# Patient Record
Sex: FEMALE | Race: BLACK OR AFRICAN AMERICAN | Employment: PART TIME | URBAN - METROPOLITAN AREA
[De-identification: names, ages, dates, MRNs, and addresses within clinical notes are randomized per-mention and may not be internally consistent; named-entity substitution may affect disease eponyms.]

---

## 2022-11-17 ENCOUNTER — OFFICE VISIT (OUTPATIENT)
Dept: FAMILY MEDICINE CLINIC | Age: 37
End: 2022-11-17
Payer: COMMERCIAL

## 2022-11-17 VITALS
TEMPERATURE: 97.9 F | HEART RATE: 77 BPM | SYSTOLIC BLOOD PRESSURE: 130 MMHG | RESPIRATION RATE: 16 BRPM | WEIGHT: 181.2 LBS | OXYGEN SATURATION: 98 % | HEIGHT: 64 IN | BODY MASS INDEX: 30.93 KG/M2 | DIASTOLIC BLOOD PRESSURE: 80 MMHG

## 2022-11-17 DIAGNOSIS — R10.32 LEFT LOWER QUADRANT ABDOMINAL PAIN: ICD-10-CM

## 2022-11-17 DIAGNOSIS — D25.0 SUBMUCOUS LEIOMYOMA OF UTERUS: ICD-10-CM

## 2022-11-17 DIAGNOSIS — D64.9 ANEMIA, UNSPECIFIED TYPE: ICD-10-CM

## 2022-11-17 DIAGNOSIS — R10.32 LEFT LOWER QUADRANT ABDOMINAL PAIN: Primary | ICD-10-CM

## 2022-11-17 LAB
A/G RATIO: 1.4 (ref 1.1–2.2)
ALBUMIN SERPL-MCNC: 4.1 G/DL (ref 3.4–5)
ALP BLD-CCNC: 46 U/L (ref 40–129)
ALT SERPL-CCNC: 8 U/L (ref 10–40)
ANION GAP SERPL CALCULATED.3IONS-SCNC: 12 MMOL/L (ref 3–16)
AST SERPL-CCNC: 13 U/L (ref 15–37)
BASOPHILS ABSOLUTE: 0 K/UL (ref 0–0.2)
BASOPHILS RELATIVE PERCENT: 0.3 %
BILIRUB SERPL-MCNC: 0.3 MG/DL (ref 0–1)
BILIRUBIN, POC: NORMAL
BLOOD URINE, POC: NORMAL
BUN BLDV-MCNC: 17 MG/DL (ref 7–20)
CALCIUM SERPL-MCNC: 8.9 MG/DL (ref 8.3–10.6)
CHLORIDE BLD-SCNC: 102 MMOL/L (ref 99–110)
CHOLESTEROL, TOTAL: 170 MG/DL (ref 0–199)
CLARITY, POC: CLEAR
CO2: 22 MMOL/L (ref 21–32)
COLOR, POC: YELLOW
CREAT SERPL-MCNC: 0.7 MG/DL (ref 0.6–1.1)
EOSINOPHILS ABSOLUTE: 0.1 K/UL (ref 0–0.6)
EOSINOPHILS RELATIVE PERCENT: 3.5 %
GFR SERPL CREATININE-BSD FRML MDRD: >60 ML/MIN/{1.73_M2}
GLUCOSE BLD-MCNC: 103 MG/DL (ref 70–99)
GLUCOSE URINE, POC: NORMAL
HCT VFR BLD CALC: 36.5 % (ref 36–48)
HDLC SERPL-MCNC: 40 MG/DL (ref 40–60)
HEMOGLOBIN: 11.6 G/DL (ref 12–16)
KETONES, POC: NORMAL
LDL CHOLESTEROL CALCULATED: 114 MG/DL
LEUKOCYTE EST, POC: NORMAL
LYMPHOCYTES ABSOLUTE: 1.3 K/UL (ref 1–5.1)
LYMPHOCYTES RELATIVE PERCENT: 38.7 %
MCH RBC QN AUTO: 27.1 PG (ref 26–34)
MCHC RBC AUTO-ENTMCNC: 31.8 G/DL (ref 31–36)
MCV RBC AUTO: 85.3 FL (ref 80–100)
MONOCYTES ABSOLUTE: 0.2 K/UL (ref 0–1.3)
MONOCYTES RELATIVE PERCENT: 7.5 %
NEUTROPHILS ABSOLUTE: 1.6 K/UL (ref 1.7–7.7)
NEUTROPHILS RELATIVE PERCENT: 50 %
NITRITE, POC: NORMAL
PDW BLD-RTO: 14.6 % (ref 12.4–15.4)
PH, POC: 6
PLATELET # BLD: 180 K/UL (ref 135–450)
PMV BLD AUTO: 9.7 FL (ref 5–10.5)
POTASSIUM SERPL-SCNC: 4.1 MMOL/L (ref 3.5–5.1)
PROTEIN, POC: NORMAL
RBC # BLD: 4.27 M/UL (ref 4–5.2)
SODIUM BLD-SCNC: 136 MMOL/L (ref 136–145)
SPECIFIC GRAVITY, POC: 1.03
TOTAL PROTEIN: 7 G/DL (ref 6.4–8.2)
TRIGL SERPL-MCNC: 81 MG/DL (ref 0–150)
TSH REFLEX: 1.54 UIU/ML (ref 0.27–4.2)
UROBILINOGEN, POC: 1
VLDLC SERPL CALC-MCNC: 16 MG/DL
WBC # BLD: 3.2 K/UL (ref 4–11)

## 2022-11-17 PROCEDURE — 36415 COLL VENOUS BLD VENIPUNCTURE: CPT | Performed by: NURSE PRACTITIONER

## 2022-11-17 PROCEDURE — 99203 OFFICE O/P NEW LOW 30 MIN: CPT | Performed by: NURSE PRACTITIONER

## 2022-11-17 PROCEDURE — 81002 URINALYSIS NONAUTO W/O SCOPE: CPT | Performed by: NURSE PRACTITIONER

## 2022-11-17 RX ORDER — NAPROXEN SODIUM 220 MG
220 TABLET ORAL 2 TIMES DAILY WITH MEALS
COMMUNITY

## 2022-11-17 SDOH — ECONOMIC STABILITY: FOOD INSECURITY: WITHIN THE PAST 12 MONTHS, YOU WORRIED THAT YOUR FOOD WOULD RUN OUT BEFORE YOU GOT MONEY TO BUY MORE.: NEVER TRUE

## 2022-11-17 SDOH — ECONOMIC STABILITY: FOOD INSECURITY: WITHIN THE PAST 12 MONTHS, THE FOOD YOU BOUGHT JUST DIDN'T LAST AND YOU DIDN'T HAVE MONEY TO GET MORE.: NEVER TRUE

## 2022-11-17 ASSESSMENT — ENCOUNTER SYMPTOMS
DIARRHEA: 0
CHEST TIGHTNESS: 0
CONSTIPATION: 1
ABDOMINAL DISTENTION: 0
WHEEZING: 0
RECTAL PAIN: 1
NAUSEA: 0
ANAL BLEEDING: 1
BACK PAIN: 0
VOMITING: 0
SHORTNESS OF BREATH: 0
BLOOD IN STOOL: 1
ABDOMINAL PAIN: 1

## 2022-11-17 ASSESSMENT — SOCIAL DETERMINANTS OF HEALTH (SDOH): HOW HARD IS IT FOR YOU TO PAY FOR THE VERY BASICS LIKE FOOD, HOUSING, MEDICAL CARE, AND HEATING?: NOT HARD AT ALL

## 2022-11-17 ASSESSMENT — PATIENT HEALTH QUESTIONNAIRE - PHQ9
SUM OF ALL RESPONSES TO PHQ QUESTIONS 1-9: 0
2. FEELING DOWN, DEPRESSED OR HOPELESS: 0
SUM OF ALL RESPONSES TO PHQ QUESTIONS 1-9: 0
SUM OF ALL RESPONSES TO PHQ QUESTIONS 1-9: 0
SUM OF ALL RESPONSES TO PHQ9 QUESTIONS 1 & 2: 0
SUM OF ALL RESPONSES TO PHQ QUESTIONS 1-9: 0
1. LITTLE INTEREST OR PLEASURE IN DOING THINGS: 0

## 2022-11-17 NOTE — PROGRESS NOTES
Poly Montilla (:  1985) is a 40 y.o. female,New patient, here for evaluation of the following chief complaint(s):  New Patient    Poly Rivera     ASSESSMENT/PLAN:  1. Left lower quadrant abdominal pain  -     Comprehensive Metabolic Panel; Future  -     Hemoglobin A1C; Future  -     Lipid Panel; Future  -     CBC with Auto Differential; Future  -     POCT Urinalysis no Micro  -     TSH with Reflex  -     US PELVIS COMPLETE; Future  -     US NON OB TRANSVAGINAL; Future    Patient Instructions   Blood work today  Schedule ultrasound  Follow up in 1-2 weeks after ultrasound is complete     Return in about 2 weeks (around 2022). SUBJECTIVE/OBJECTIVE:  Pt here to establish care. Was seeing UC physicians but had to switch d/t insurance. Unable to see previous records d/t chart merge in process. Pt c/o left lower quadrant abd pain that has increased over the last 2 week. Radiates to left side/back. Admits to frequent urination, pelvic pain with urination and bowel movements, and intermittent blood in stools. States blood is minimal and does not occur with every bm. Hx of ectopic pregnancy in right tube, and had left tube removed d/t cyst like growth 1 year ago. Menses are regular, denies increased pain with menses. Current Outpatient Medications   Medication Sig Dispense Refill    naproxen sodium (ALEVE) 220 MG tablet Take 220 mg by mouth 2 times daily (with meals)       No current facility-administered medications for this visit. Past Medical History:  No date: Ectopic pregnancy  History reviewed. No pertinent surgical history.   Social History     Socioeconomic History    Marital status: Not on file     Spouse name: Not on file    Number of children: Not on file    Years of education: Not on file    Highest education level: Not on file   Occupational History    Not on file   Tobacco Use    Smoking status: Never    Smokeless tobacco: Never   Vaping Use    Vaping Use: Never used   Substance and Sexual Activity    Alcohol use: Never    Drug use: Never    Sexual activity: Not Currently   Other Topics Concern    Not on file   Social History Narrative    Not on file     Social Determinants of Health     Financial Resource Strain: Low Risk     Difficulty of Paying Living Expenses: Not hard at all   Food Insecurity: No Food Insecurity    Worried About Running Out of Food in the Last Year: Never true    Ran Out of Food in the Last Year: Never true   Transportation Needs: Not on file   Physical Activity: Not on file   Stress: Not on file   Social Connections: Not on file   Intimate Partner Violence: Not on file   Housing Stability: Not on file         Review of Systems   Constitutional:  Negative for activity change, appetite change, chills, fatigue and fever. Eyes:  Negative for visual disturbance. Respiratory:  Negative for chest tightness, shortness of breath and wheezing. Cardiovascular:  Negative for chest pain, palpitations and leg swelling. Gastrointestinal:  Positive for abdominal pain, anal bleeding, blood in stool, constipation (at times, relieved with prune juice) and rectal pain. Negative for abdominal distention, diarrhea, nausea and vomiting. Genitourinary:  Positive for dyspareunia, dysuria, frequency, pelvic pain and urgency. Negative for decreased urine volume, difficulty urinating, enuresis, flank pain, genital sores, hematuria, menstrual problem, vaginal discharge and vaginal pain. Musculoskeletal:  Negative for arthralgias, back pain and gait problem.    Vitals:    11/17/22 0809   BP: 130/80   Pulse: 77   Resp: 16   Temp: 97.9 °F (36.6 °C)   TempSrc: Temporal   SpO2: 98%   Weight: 181 lb 3.2 oz (82.2 kg)   Height: 5' 4\" (1.626 m)      Recent Results (from the past 2016 hour(s))   POCT Urinalysis no Micro    Collection Time: 11/17/22  8:59 AM   Result Value Ref Range    Color, UA yellow     Clarity, UA clear     Glucose, UA POC neg     Bilirubin, UA neg Ketones, UA neg     Spec Grav, UA 1.030     Blood, UA POC neg     pH, UA 6.0     Protein, UA POC neg     Urobilinogen, UA 1.0     Leukocytes, UA neg     Nitrite, UA neg         Wt Readings from Last 3 Encounters:   11/17/22 181 lb 3.2 oz (82.2 kg)        Physical Exam  Vitals and nursing note reviewed. Constitutional:       General: She is not in acute distress. Appearance: Normal appearance. She is not ill-appearing. HENT:      Head: Normocephalic and atraumatic. Nose: Nose normal. No congestion. Eyes:      Conjunctiva/sclera: Conjunctivae normal.      Pupils: Pupils are equal, round, and reactive to light. Cardiovascular:      Rate and Rhythm: Normal rate and regular rhythm. Pulmonary:      Effort: Pulmonary effort is normal.      Breath sounds: Normal breath sounds. Abdominal:      General: Abdomen is flat. Bowel sounds are normal. There is no distension or abdominal bruit. There are no signs of injury. Palpations: Abdomen is soft. There is no shifting dullness, hepatomegaly, splenomegaly or mass. Tenderness: There is abdominal tenderness. There is no right CVA tenderness, left CVA tenderness, guarding or rebound. Negative signs include Richardson's sign and McBurney's sign. Hernia: No hernia is present. Musculoskeletal:         General: Normal range of motion. Cervical back: Normal range of motion and neck supple. Skin:     General: Skin is warm. Neurological:      General: No focal deficit present. Mental Status: She is alert and oriented to person, place, and time. Psychiatric:         Mood and Affect: Mood normal.         Behavior: Behavior normal.                 An electronic signature was used to authenticate this note.     --Braeden Marina, APRN - CNP

## 2022-11-18 ENCOUNTER — PATIENT MESSAGE (OUTPATIENT)
Dept: FAMILY MEDICINE CLINIC | Age: 37
End: 2022-11-18

## 2022-11-18 DIAGNOSIS — R10.32 LEFT LOWER QUADRANT ABDOMINAL PAIN: Primary | ICD-10-CM

## 2022-11-18 LAB
ESTIMATED AVERAGE GLUCOSE: 91.1 MG/DL
HBA1C MFR BLD: 4.8 %
IRON SATURATION: 21 % (ref 15–50)
IRON: 55 UG/DL (ref 37–145)
TOTAL IRON BINDING CAPACITY: 266 UG/DL (ref 260–445)

## 2022-11-29 ENCOUNTER — HOSPITAL ENCOUNTER (OUTPATIENT)
Dept: ULTRASOUND IMAGING | Age: 37
Discharge: HOME | End: 2022-11-29
Payer: COMMERCIAL

## 2022-11-29 DIAGNOSIS — R10.32 LEFT LOWER QUADRANT ABDOMINAL PAIN: ICD-10-CM

## 2022-11-29 PROCEDURE — 76856 US EXAM PELVIC COMPLETE: CPT

## 2022-11-30 ENCOUNTER — TELEPHONE (OUTPATIENT)
Dept: FAMILY MEDICINE CLINIC | Age: 37
End: 2022-11-30

## 2022-11-30 NOTE — TELEPHONE ENCOUNTER
Patient need help with accessing my chart / [facility]    Patient states she has been blocked out  Asking for help    Patient can't access

## 2022-12-13 ENCOUNTER — OFFICE VISIT (OUTPATIENT)
Dept: FAMILY MEDICINE CLINIC | Age: 37
End: 2022-12-13
Payer: COMMERCIAL

## 2022-12-13 VITALS
TEMPERATURE: 98.2 F | WEIGHT: 180.8 LBS | DIASTOLIC BLOOD PRESSURE: 76 MMHG | BODY MASS INDEX: 30.87 KG/M2 | SYSTOLIC BLOOD PRESSURE: 130 MMHG | RESPIRATION RATE: 14 BRPM | HEART RATE: 76 BPM | HEIGHT: 64 IN | OXYGEN SATURATION: 98 %

## 2022-12-13 DIAGNOSIS — D25.0 SUBMUCOUS LEIOMYOMA OF UTERUS: ICD-10-CM

## 2022-12-13 DIAGNOSIS — N83.202 CYST OF LEFT OVARY: Primary | ICD-10-CM

## 2022-12-13 PROCEDURE — 99213 OFFICE O/P EST LOW 20 MIN: CPT | Performed by: NURSE PRACTITIONER

## 2022-12-13 ASSESSMENT — PATIENT HEALTH QUESTIONNAIRE - PHQ9
SUM OF ALL RESPONSES TO PHQ QUESTIONS 1-9: 0
1. LITTLE INTEREST OR PLEASURE IN DOING THINGS: 0
SUM OF ALL RESPONSES TO PHQ QUESTIONS 1-9: 0
2. FEELING DOWN, DEPRESSED OR HOPELESS: 0
SUM OF ALL RESPONSES TO PHQ9 QUESTIONS 1 & 2: 0
SUM OF ALL RESPONSES TO PHQ QUESTIONS 1-9: 0
SUM OF ALL RESPONSES TO PHQ QUESTIONS 1-9: 0

## 2022-12-13 ASSESSMENT — ANXIETY QUESTIONNAIRES
2. NOT BEING ABLE TO STOP OR CONTROL WORRYING: 0
5. BEING SO RESTLESS THAT IT IS HARD TO SIT STILL: 0
6. BECOMING EASILY ANNOYED OR IRRITABLE: 0
GAD7 TOTAL SCORE: 0
4. TROUBLE RELAXING: 0
IF YOU CHECKED OFF ANY PROBLEMS ON THIS QUESTIONNAIRE, HOW DIFFICULT HAVE THESE PROBLEMS MADE IT FOR YOU TO DO YOUR WORK, TAKE CARE OF THINGS AT HOME, OR GET ALONG WITH OTHER PEOPLE: NOT DIFFICULT AT ALL
1. FEELING NERVOUS, ANXIOUS, OR ON EDGE: 0
3. WORRYING TOO MUCH ABOUT DIFFERENT THINGS: 0
7. FEELING AFRAID AS IF SOMETHING AWFUL MIGHT HAPPEN: 0

## 2022-12-18 ASSESSMENT — ENCOUNTER SYMPTOMS
BACK PAIN: 0
BLOOD IN STOOL: 0
APNEA: 0
COUGH: 0
COLOR CHANGE: 0
WHEEZING: 0
SHORTNESS OF BREATH: 0
EYE REDNESS: 0
CONSTIPATION: 0
DIARRHEA: 0
TROUBLE SWALLOWING: 0
CHEST TIGHTNESS: 0
ABDOMINAL PAIN: 0
NAUSEA: 0
VOMITING: 0

## 2022-12-18 NOTE — PROGRESS NOTES
Galen Montilla (:  1985) is a 40 y.o. female,Established patient, here for evaluation of the following chief complaint(s): Other (Review labs and U/S)      ASSESSMENT/PLAN:  1. Cyst of left ovary  - resources given for previous referral to Dr. Susanna Merrill  - Dr. Luana Glover information given for reproductive endocrinology, will have to verify with insurance  2. Submucous leiomyoma of uterus  - needs obgyn follow up    Patient Instructions   Follow up with DR. Susanna Merrill or Dr. Luana Glover  Follow up as needed     No follow-ups on file. SUBJECTIVE/OBJECTIVE:  Pt here for follow up on lab results and discuss fertility options. Had pelvic ultrasound for pelvic pain which shows benign ovarian cyst and uterine fibroid. Pt previously saw  physicians for obgyn and reproductive endocrinology but had insurance change and is not able to follow up with them. Pt is interested in conceiving       Current Outpatient Medications   Medication Sig Dispense Refill    naproxen sodium (ALEVE) 220 MG tablet Take 220 mg by mouth 2 times daily (with meals)       No current facility-administered medications for this visit. Past Medical History:  No date: Allergic rhinitis  No date: Ectopic pregnancy  Past Surgical History:   Procedure Laterality Date    ECTOPIC PREGNANCY SURGERY  2012    tubal; treated [facility] in [facility].      LAPAROTOMY      remove swallowed object    SALPINGECTOMY Right 2012    ectopic     Social History     Socioeconomic History    Marital status:      Spouse name: Haylie    Number of children: 0    Years of education: Not on file    Highest education level: Not on file   Occupational History    Occupation: packaging: [employer]   Tobacco Use    Smoking status: Never    Smokeless tobacco: Never   Vaping Use    Vaping Use: Never used   Substance and Sexual Activity    Alcohol use: Never     Comment: occasionally    Drug use: Never    Sexual activity: Not Currently     Partners: Male     Comment:     Other Topics Concern    Not on file   Social History Narrative    ** Merged History Encounter **          Social Determinants of Health     Financial Resource Strain: Low Risk     Difficulty of Paying Living Expenses: Not hard at all   Food Insecurity: No Food Insecurity    Worried About Running Out of Food in the Last Year: Never true    Ran Out of Food in the Last Year: Never true   Transportation Needs: Not on file   Physical Activity: Not on file   Stress: Not on file   Social Connections: Not on file   Intimate Partner Violence: Not on file   Housing Stability: Not on file         Review of Systems   Constitutional:  Negative for appetite change, chills, diaphoresis, fatigue, fever and unexpected weight change. HENT:  Negative for congestion, dental problem, ear pain, hearing loss and trouble swallowing. Eyes:  Negative for redness and visual disturbance. Respiratory:  Negative for apnea, cough, chest tightness, shortness of breath and wheezing. Cardiovascular:  Negative for chest pain, palpitations and leg swelling. Gastrointestinal:  Negative for abdominal pain, blood in stool, constipation, diarrhea, nausea and vomiting. Endocrine: Negative for cold intolerance, heat intolerance, polydipsia, polyphagia and polyuria. Genitourinary:  Positive for menstrual problem. Negative for decreased urine volume, difficulty urinating and hematuria. Musculoskeletal:  Negative for arthralgias, back pain, gait problem, joint swelling and myalgias. Skin:  Negative for color change, pallor and rash. Allergic/Immunologic: Negative for environmental allergies, food allergies and immunocompromised state. Neurological:  Negative for dizziness, weakness and headaches. Psychiatric/Behavioral:  Negative for agitation, confusion and sleep disturbance.     Vitals:    12/13/22 1300   BP: 130/76   Pulse: 76   Resp: 14   Temp: 98.2 °F (36.8 °C)   SpO2: 98%   Weight: 180 lb 12.8 oz (82 kg)   Height: 5' 4\" (1.626 m)      Recent Results (from the past 2016 hour(s))   POCT Urinalysis no Micro    Collection Time: 11/17/22  8:59 AM   Result Value Ref Range    Color, UA yellow     Clarity, UA clear     Glucose, UA POC neg     Bilirubin, UA neg     Ketones, UA neg     Spec Grav, UA 1.030     Blood, UA POC neg     pH, UA 6.0     Protein, UA POC neg     Urobilinogen, UA 1.0     Leukocytes, UA neg     Nitrite, UA neg    TSH with Reflex    Collection Time: 11/17/22  9:05 AM   Result Value Ref Range    TSH 1.54 0.27 - 4.20 uIU/mL   CBC with Auto Differential    Collection Time: 11/17/22  9:05 AM   Result Value Ref Range    WBC 3.2 (L) 4.0 - 11.0 K/uL    RBC 4.27 4.00 - 5.20 M/uL    Hemoglobin 11.6 (L) 12.0 - 16.0 g/dL    Hematocrit 36.5 36.0 - 48.0 %    MCV 85.3 80.0 - 100.0 fL    MCH 27.1 26.0 - 34.0 pg    MCHC 31.8 31.0 - 36.0 g/dL    RDW 14.6 12.4 - 15.4 %    Platelets 155 160 - 558 K/uL    MPV 9.7 5.0 - 10.5 fL    Neutrophils % 50.0 %    Lymphocytes % 38.7 %    Monocytes % 7.5 %    Eosinophils % 3.5 %    Basophils % 0.3 %    Neutrophils Absolute 1.6 (L) 1.7 - 7.7 K/uL    Lymphocytes Absolute 1.3 1.0 - 5.1 K/uL    Monocytes Absolute 0.2 0.0 - 1.3 K/uL    Eosinophils Absolute 0.1 0.0 - 0.6 K/uL    Basophils Absolute 0.0 0.0 - 0.2 K/uL   Lipid Panel    Collection Time: 11/17/22  9:05 AM   Result Value Ref Range    Cholesterol, Total 170 0 - 199 mg/dL    Triglycerides 81 0 - 150 mg/dL    HDL 40 40 - 60 mg/dL    LDL Calculated 114 (H) <100 mg/dL    VLDL Cholesterol Calculated 16 Not Established mg/dL   Hemoglobin A1C    Collection Time: 11/17/22  9:05 AM   Result Value Ref Range    Hemoglobin A1C 4.8 See comment %    eAG 91.1 mg/dL   Comprehensive Metabolic Panel    Collection Time: 11/17/22  9:05 AM   Result Value Ref Range    Sodium 136 136 - 145 mmol/L    Potassium 4.1 3.5 - 5.1 mmol/L    Chloride 102 99 - 110 mmol/L    CO2 22 21 - 32 mmol/L    Anion Gap 12 3 - 16    Glucose 103 (H) 70 - 99 mg/dL    BUN 17 Behavior: Behavior normal.         Thought Content: Thought content normal.         Judgment: Judgment normal.                 An electronic signature was used to authenticate this note.     --MESFIN Post - CNP

## 2023-10-04 ENCOUNTER — OFFICE VISIT (OUTPATIENT)
Dept: FAMILY MEDICINE CLINIC | Age: 38
End: 2023-10-04
Payer: COMMERCIAL

## 2023-10-04 VITALS
HEIGHT: 64 IN | RESPIRATION RATE: 16 BRPM | SYSTOLIC BLOOD PRESSURE: 118 MMHG | BODY MASS INDEX: 30.05 KG/M2 | OXYGEN SATURATION: 98 % | DIASTOLIC BLOOD PRESSURE: 64 MMHG | TEMPERATURE: 98.4 F | WEIGHT: 176 LBS | HEART RATE: 64 BPM

## 2023-10-04 DIAGNOSIS — H69.93 DYSFUNCTION OF BOTH EUSTACHIAN TUBES: ICD-10-CM

## 2023-10-04 DIAGNOSIS — B96.89 ACUTE BACTERIAL SINUSITIS: ICD-10-CM

## 2023-10-04 DIAGNOSIS — R07.89 OTHER CHEST PAIN: Primary | ICD-10-CM

## 2023-10-04 DIAGNOSIS — J01.90 ACUTE BACTERIAL SINUSITIS: ICD-10-CM

## 2023-10-04 DIAGNOSIS — K21.9 GASTROESOPHAGEAL REFLUX DISEASE WITHOUT ESOPHAGITIS: ICD-10-CM

## 2023-10-04 DIAGNOSIS — R42 DIZZINESS: ICD-10-CM

## 2023-10-04 PROCEDURE — 93000 ELECTROCARDIOGRAM COMPLETE: CPT | Performed by: NURSE PRACTITIONER

## 2023-10-04 PROCEDURE — 99214 OFFICE O/P EST MOD 30 MIN: CPT | Performed by: NURSE PRACTITIONER

## 2023-10-04 RX ORDER — PANTOPRAZOLE SODIUM 40 MG/1
40 TABLET, DELAYED RELEASE ORAL
Qty: 14 TABLET | Refills: 0 | Status: SHIPPED | OUTPATIENT
Start: 2023-10-04 | End: 2023-10-18

## 2023-10-04 RX ORDER — AMOXICILLIN 875 MG/1
875 TABLET, COATED ORAL 2 TIMES DAILY
Qty: 20 TABLET | Refills: 0 | Status: SHIPPED | OUTPATIENT
Start: 2023-10-04 | End: 2023-10-14

## 2023-10-04 RX ORDER — FLUTICASONE PROPIONATE 50 MCG
2 SPRAY, SUSPENSION (ML) NASAL DAILY
Qty: 16 G | Refills: 0 | Status: SHIPPED | OUTPATIENT
Start: 2023-10-04

## 2023-10-04 SDOH — ECONOMIC STABILITY: HOUSING INSECURITY
IN THE LAST 12 MONTHS, WAS THERE A TIME WHEN YOU DID NOT HAVE A STEADY PLACE TO SLEEP OR SLEPT IN A SHELTER (INCLUDING NOW)?: NO

## 2023-10-04 SDOH — ECONOMIC STABILITY: INCOME INSECURITY: HOW HARD IS IT FOR YOU TO PAY FOR THE VERY BASICS LIKE FOOD, HOUSING, MEDICAL CARE, AND HEATING?: NOT HARD AT ALL

## 2023-10-04 SDOH — ECONOMIC STABILITY: FOOD INSECURITY: WITHIN THE PAST 12 MONTHS, THE FOOD YOU BOUGHT JUST DIDN'T LAST AND YOU DIDN'T HAVE MONEY TO GET MORE.: NEVER TRUE

## 2023-10-04 SDOH — ECONOMIC STABILITY: FOOD INSECURITY: WITHIN THE PAST 12 MONTHS, YOU WORRIED THAT YOUR FOOD WOULD RUN OUT BEFORE YOU GOT MONEY TO BUY MORE.: NEVER TRUE

## 2023-10-04 ASSESSMENT — ENCOUNTER SYMPTOMS
RHINORRHEA: 0
SHORTNESS OF BREATH: 0
WHEEZING: 0
HOARSE VOICE: 0
SINUS COMPLAINT: 1
TROUBLE SWALLOWING: 0
COUGH: 0
SINUS PRESSURE: 1
SORE THROAT: 0

## 2023-10-04 ASSESSMENT — PATIENT HEALTH QUESTIONNAIRE - PHQ9
SUM OF ALL RESPONSES TO PHQ QUESTIONS 1-9: 0
2. FEELING DOWN, DEPRESSED OR HOPELESS: 0
SUM OF ALL RESPONSES TO PHQ9 QUESTIONS 1 & 2: 0
1. LITTLE INTEREST OR PLEASURE IN DOING THINGS: 0
SUM OF ALL RESPONSES TO PHQ QUESTIONS 1-9: 0

## 2023-10-04 NOTE — PATIENT INSTRUCTIONS
Start amoxicillin x 10 days  Use flonase twice a day x 1 week then use once per day  Stop medication from urgent care  Start protonix daily before breakfast

## 2023-10-04 NOTE — PROGRESS NOTES
Problem  This is a new problem. The current episode started 1 to 4 weeks ago. The problem is unchanged. There has been no fever. She is experiencing no pain. Associated symptoms include headaches and sinus pressure. Pertinent negatives include no chills, congestion, coughing, diaphoresis, ear pain, hoarse voice, neck pain, shortness of breath or sore throat. The treatment provided no relief. Current Outpatient Medications   Medication Sig Dispense Refill    amoxicillin (AMOXIL) 875 MG tablet Take 1 tablet by mouth 2 times daily for 10 days 20 tablet 0    fluticasone (FLONASE) 50 MCG/ACT nasal spray 2 sprays by Each Nostril route daily 16 g 0    pantoprazole (PROTONIX) 40 MG tablet Take 1 tablet by mouth every morning (before breakfast) for 14 days 14 tablet 0     No current facility-administered medications for this visit. Past Medical History:  No date: Allergic rhinitis  No date: Ectopic pregnancy  Past Surgical History:   Procedure Laterality Date    ECTOPIC PREGNANCY SURGERY  2012    tubal; treated surgically in [place].      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

## 2023-10-18 ENCOUNTER — OFFICE VISIT (OUTPATIENT)
Dept: FAMILY MEDICINE CLINIC | Age: 38
End: 2023-10-18
Payer: COMMERCIAL

## 2023-10-18 VITALS
SYSTOLIC BLOOD PRESSURE: 122 MMHG | RESPIRATION RATE: 18 BRPM | HEART RATE: 82 BPM | WEIGHT: 176.1 LBS | OXYGEN SATURATION: 98 % | BODY MASS INDEX: 30.07 KG/M2 | HEIGHT: 64 IN | TEMPERATURE: 98 F | DIASTOLIC BLOOD PRESSURE: 80 MMHG

## 2023-10-18 DIAGNOSIS — R42 DIZZINESS: ICD-10-CM

## 2023-10-18 DIAGNOSIS — Z13.220 SCREENING FOR HYPERLIPIDEMIA: Primary | ICD-10-CM

## 2023-10-18 DIAGNOSIS — K21.9 GASTROESOPHAGEAL REFLUX DISEASE WITHOUT ESOPHAGITIS: ICD-10-CM

## 2023-10-18 PROCEDURE — 99213 OFFICE O/P EST LOW 20 MIN: CPT | Performed by: NURSE PRACTITIONER

## 2023-10-18 RX ORDER — PANTOPRAZOLE SODIUM 40 MG/1
40 TABLET, DELAYED RELEASE ORAL
Qty: 14 TABLET | Refills: 0 | Status: SHIPPED | OUTPATIENT
Start: 2023-10-18 | End: 2023-11-01

## 2023-10-18 ASSESSMENT — ENCOUNTER SYMPTOMS
SINUS COMPLAINT: 1
TROUBLE SWALLOWING: 0
WHEEZING: 0
SINUS PRESSURE: 0
RHINORRHEA: 0
SORE THROAT: 0
HOARSE VOICE: 0

## 2023-10-18 NOTE — PATIENT INSTRUCTIONS
[address] [facility] Laboratory Locations - No appointment necessary. ? indicates the location is open Saturdays in addition to Monday through Friday. Call your preferred location for test preparation, business hours and other information you need. [employer] accepts [employer]. [facility]    ? [place] [id number] [facility]. [facility], [address]    Ph: [address] [facility], [address]    Ph: [phone]   ? [address]    [place], [address] [facility]    Ph: [address] [facility] [address], [address]    Ph: [phone] ? [place]   [address]   Ph: [phone]  ? [address] [facility], [address] [facility]    Ph: [place] [address] [facility], [address] [facility]   Ph: [phone]    NORTH    ? [place], [place] [address]    Ph: [phone]  [facility], [address]   Ph: [facility]. [place], [address]    [address] [facility]: [facility] [address], [facility] [facility]    Ph: [facility].  [address] [place] [address]    Ph: [phone]

## 2023-10-31 DIAGNOSIS — K21.9 GASTROESOPHAGEAL REFLUX DISEASE WITHOUT ESOPHAGITIS: ICD-10-CM

## 2023-10-31 NOTE — TELEPHONE ENCOUNTER
Medication:   Requested Prescriptions     Pending Prescriptions Disp Refills    pantoprazole (PROTONIX) 40 MG tablet [Pharmacy Med Name: PANTOPRAZOLE 40MG TABLETS] 14 tablet 0     Sig: TAKE 1 TABLET BY MOUTH EVERY MORNING BEFORE BREAKFAST FOR 14 DAYS        Last Filled:      Patient Phone Number: [phone] (home)     Last appt: 10/18/2023   Next appt: Visit date not found    Last OARRS:        No data to display

## 2023-11-01 DIAGNOSIS — J01.90 ACUTE BACTERIAL SINUSITIS: ICD-10-CM

## 2023-11-01 DIAGNOSIS — B96.89 ACUTE BACTERIAL SINUSITIS: ICD-10-CM

## 2023-11-01 RX ORDER — PANTOPRAZOLE SODIUM 40 MG/1
TABLET, DELAYED RELEASE ORAL
Qty: 14 TABLET | Refills: 0 | Status: SHIPPED | OUTPATIENT
Start: 2023-11-01

## 2023-11-01 RX ORDER — FLUTICASONE PROPIONATE 50 MCG
2 SPRAY, SUSPENSION (ML) NASAL DAILY
Qty: 16 G | Refills: 0 | Status: SHIPPED | OUTPATIENT
Start: 2023-11-01

## 2023-11-01 NOTE — TELEPHONE ENCOUNTER
Medication:   Requested Prescriptions     Pending Prescriptions Disp Refills    fluticasone (FLONASE) 50 MCG/ACT nasal spray [Pharmacy Med Name: FLUTICASONE 50MCG NASAL SP (120) RX] 16 g 0     Sig: SHAKE LIQUID AND USE 2 SPRAYS IN EACH NOSTRIL DAILY        Last Filled:      Patient Phone Number: [phone] (home)     Last appt: 10/18/2023   Next appt: Visit date not found    Last OARRS:        No data to display

## 2024-01-15 ENCOUNTER — HOSPITAL ENCOUNTER (EMERGENCY)
Age: 39
Discharge: HOME | End: 2024-01-15
Attending: EMERGENCY MEDICINE
Payer: COMMERCIAL

## 2024-01-15 ENCOUNTER — APPOINTMENT (OUTPATIENT)
Dept: GENERAL RADIOLOGY | Age: 39
End: 2024-01-15
Payer: COMMERCIAL

## 2024-01-15 VITALS
DIASTOLIC BLOOD PRESSURE: 88 MMHG | RESPIRATION RATE: 16 BRPM | OXYGEN SATURATION: 97 % | TEMPERATURE: 98.8 F | HEART RATE: 68 BPM | SYSTOLIC BLOOD PRESSURE: 142 MMHG

## 2024-01-15 DIAGNOSIS — R07.89 CHEST PRESSURE: ICD-10-CM

## 2024-01-15 DIAGNOSIS — R00.2 PALPITATIONS: Primary | ICD-10-CM

## 2024-01-15 LAB
ALBUMIN SERPL-MCNC: 4.4 G/DL (ref 3.4–5)
ALBUMIN/GLOB SERPL: 1.3 {RATIO} (ref 1.1–2.2)
ALP SERPL-CCNC: 41 U/L (ref 40–129)
ALT SERPL-CCNC: 9 U/L (ref 10–40)
ANION GAP SERPL CALCULATED.3IONS-SCNC: 11 MMOL/L (ref 3–16)
AST SERPL-CCNC: 16 U/L (ref 15–37)
BASOPHILS # BLD: 0.1 K/UL (ref 0–0.2)
BASOPHILS NFR BLD: 0.9 %
BILIRUB SERPL-MCNC: 0.4 MG/DL (ref 0–1)
BUN SERPL-MCNC: 13 MG/DL (ref 7–20)
CALCIUM SERPL-MCNC: 9.3 MG/DL (ref 8.3–10.6)
CHLORIDE SERPL-SCNC: 102 MMOL/L (ref 99–110)
CO2 SERPL-SCNC: 22 MMOL/L (ref 21–32)
CREAT SERPL-MCNC: 0.7 MG/DL (ref 0.6–1.1)
D DIMER: 0.34 UG/ML FEU (ref 0–0.6)
DEPRECATED RDW RBC AUTO: 14.2 % (ref 12.4–15.4)
EOSINOPHIL # BLD: 0.1 K/UL (ref 0–0.6)
EOSINOPHIL NFR BLD: 2.4 %
GFR SERPLBLD CREATININE-BSD FMLA CKD-EPI: >60 ML/MIN/{1.73_M2}
GLUCOSE SERPL-MCNC: 94 MG/DL (ref 70–99)
HCG SERPL QL: NEGATIVE
HCT VFR BLD AUTO: 34.6 % (ref 36–48)
HGB BLD-MCNC: 11.3 G/DL (ref 12–16)
LYMPHOCYTES # BLD: 2.1 K/UL (ref 1–5.1)
LYMPHOCYTES NFR BLD: 33.9 %
MCH RBC QN AUTO: 27.2 PG (ref 26–34)
MCHC RBC AUTO-ENTMCNC: 32.6 G/DL (ref 31–36)
MCV RBC AUTO: 83.3 FL (ref 80–100)
MONOCYTES # BLD: 0.4 K/UL (ref 0–1.3)
MONOCYTES NFR BLD: 6 %
NEUTROPHILS # BLD: 3.5 K/UL (ref 1.7–7.7)
NEUTROPHILS NFR BLD: 56.8 %
NT-PROBNP SERPL-MCNC: <36 PG/ML (ref 0–124)
PLATELET # BLD AUTO: 194 K/UL (ref 135–450)
PMV BLD AUTO: 8.8 FL (ref 5–10.5)
POTASSIUM SERPL-SCNC: 3.7 MMOL/L (ref 3.5–5.1)
PROT SERPL-MCNC: 7.8 G/DL (ref 6.4–8.2)
RBC # BLD AUTO: 4.15 M/UL (ref 4–5.2)
SODIUM SERPL-SCNC: 135 MMOL/L (ref 136–145)
TROPONIN, HIGH SENSITIVITY: <6 NG/L (ref 0–14)
TROPONIN, HIGH SENSITIVITY: <6 NG/L (ref 0–14)
WBC # BLD AUTO: 6.1 K/UL (ref 4–11)

## 2024-01-15 PROCEDURE — 84703 CHORIONIC GONADOTROPIN ASSAY: CPT

## 2024-01-15 PROCEDURE — 83880 ASSAY OF NATRIURETIC PEPTIDE: CPT

## 2024-01-15 PROCEDURE — 93005 ELECTROCARDIOGRAM TRACING: CPT | Performed by: EMERGENCY MEDICINE

## 2024-01-15 PROCEDURE — 99285 EMERGENCY DEPT VISIT HI MDM: CPT

## 2024-01-15 PROCEDURE — 85379 FIBRIN DEGRADATION QUANT: CPT

## 2024-01-15 PROCEDURE — 71045 X-RAY EXAM CHEST 1 VIEW: CPT

## 2024-01-15 PROCEDURE — 80053 COMPREHEN METABOLIC PANEL: CPT

## 2024-01-15 PROCEDURE — 84484 ASSAY OF TROPONIN QUANT: CPT

## 2024-01-15 PROCEDURE — 85025 COMPLETE CBC W/AUTO DIFF WBC: CPT

## 2024-01-15 RX ORDER — NAPROXEN 500 MG/1
500 TABLET ORAL 2 TIMES DAILY WITH MEALS
Qty: 14 TABLET | Refills: 0 | Status: SHIPPED | OUTPATIENT
Start: 2024-01-15 | End: 2024-01-22

## 2024-01-15 ASSESSMENT — ENCOUNTER SYMPTOMS
WHEEZING: 0
CHEST TIGHTNESS: 0
ABDOMINAL PAIN: 0
SHORTNESS OF BREATH: 0
DIARRHEA: 0
VOMITING: 0
NAUSEA: 0
COUGH: 0

## 2024-01-15 ASSESSMENT — LIFESTYLE VARIABLES
HOW MANY STANDARD DRINKS CONTAINING ALCOHOL DO YOU HAVE ON A TYPICAL DAY: PATIENT DOES NOT DRINK
HOW OFTEN DO YOU HAVE A DRINK CONTAINING ALCOHOL: NEVER

## 2024-01-15 ASSESSMENT — HEART SCORE: ECG: 1

## 2024-01-16 LAB
EKG ATRIAL RATE: 71 BPM
EKG DIAGNOSIS: NORMAL
EKG P AXIS: 65 DEGREES
EKG P-R INTERVAL: 118 MS
EKG Q-T INTERVAL: 412 MS
EKG QRS DURATION: 92 MS
EKG QTC CALCULATION (BAZETT): 447 MS
EKG R AXIS: 60 DEGREES
EKG T AXIS: 12 DEGREES
EKG VENTRICULAR RATE: 71 BPM

## 2024-01-16 PROCEDURE — 93010 ELECTROCARDIOGRAM REPORT: CPT | Performed by: INTERNAL MEDICINE

## 2024-01-16 NOTE — ED PROVIDER NOTES
[facility] EMERGENCY DEPARTMENT  EMERGENCY DEPARTMENT ENCOUNTER        Pt Name: Melissa Montilla  MRN: [id number]  Birthdate 1985  Date of evaluation: 1/15/2024  Provider: Carter Mcrae PA-C  PCP: Rudy Valdivia APRN - CNP  Note Started: 7:04 PM EST 1/15/24       I have seen and evaluated this patient with my supervising physician Wayne Frias DO.      CHIEF COMPLAINT       Chief Complaint   Patient presents with    Chest Pain     CP x4 weeks, unable to get into PCP, feels like her heart is beating fast and then gets chest pressure       HISTORY OF PRESENT ILLNESS: 1 or more Elements     History from : Patient    Limitations to history : None    Melissa Montilla is a 38 y.o. female who presents to the emergency department today stating for the last months she has been having intermittent heart palpitations followed by chest pressure.  She states this has been occurring several times a day and can last for up to 1 hour.  Her last episode was today prior to arrival.  She denies shortness of breath, diaphoresis, lightheadedness or dizziness.  She denies fevers, chills, coughing or recent illness.  She denies recent travel, unilateral leg swelling or history of coagulopathy.      Nursing Notes were all reviewed and agreed with or any disagreements were addressed in the HPI.    REVIEW OF SYSTEMS :      Review of Systems   Constitutional:  Negative for chills and fever.   Respiratory:  Negative for cough, chest tightness, shortness of breath and wheezing.    Cardiovascular:  Positive for chest pain and palpitations. Negative for leg swelling.   Gastrointestinal:  Negative for abdominal pain, diarrhea, nausea and vomiting.   Genitourinary:  Negative for difficulty urinating, dysuria, flank pain, hematuria and urgency.   Neurological:  Negative for dizziness, weakness, light-headedness, numbness and headaches.   All other systems reviewed and are negative.      Positives and 
  HCG Qualitative, Serum   Result Value Ref Range    hCG Qual Negative Detects HCG level >10 MIU/mL   Troponin   Result Value Ref Range    Troponin, High Sensitivity <6 0 - 14 ng/L   Brain Natriuretic Peptide   Result Value Ref Range    Pro-BNP <36 0 - 124 pg/mL   D-Dimer, Quantitative   Result Value Ref Range    D-Dimer, Quant 0.34 0.00 - 0.60 ug/mL FEU   Troponin   Result Value Ref Range    Troponin, High Sensitivity <6 0 - 14 ng/L   EKG 12 Lead   Result Value Ref Range    Ventricular Rate 71 BPM    Atrial Rate 71 BPM    P-R Interval 118 ms    QRS Duration 92 ms    Q-T Interval 412 ms    QTc Calculation (Bazett) 447 ms    P Axis 65 degrees    R Axis 60 degrees    T Axis 12 degrees    Diagnosis       Normal sinus rhythmST abnormality, possible digitalis effectAbnormal ECG       BRIEF ED COURSE/MDM  Old records reviewed. Labs and imaging reviewed.  Patient seen and evaluated by myself along with the MESSI.  Patient is present for evaluation of palpitations and chest pain.  Patient looks well here and is asymptomatic currently.  Patient's workup including EKG is unremarkable.  Initial troponin is negative.  I do feel that if the second troponin is negative she will be able to be discharged.  Patient is in agreement will need to follow-up with her doctor.    Medications given:  Medications - No data to display     Discharge Medications:  Discharge Medication List as of 1/15/2024  9:37 PM        START taking these medications    Details   naproxen (NAPROSYN) 500 MG tablet Take 1 tablet by mouth 2 times daily (with meals) for 7 days, Disp-14 tablet, R-0Normal             PROCEDURES  None    CLINICAL IMPRESSION  1. Palpitations    2. Chest pressure        DISPOSITION  Melissa Montilla was discharged in stable condition.    CRITICAL CARE TIME  None        Electronically signed by: Wayne Frias Jr., , FACEP, FAAEM, 1/16/2024  3:08 PM       Wayne Frias DO  01/16/24 [id number]

## 2024-05-22 ENCOUNTER — OFFICE VISIT (OUTPATIENT)
Dept: FAMILY MEDICINE CLINIC | Age: 39
End: 2024-05-22
Payer: COMMERCIAL

## 2024-05-22 VITALS
BODY MASS INDEX: 30.9 KG/M2 | HEART RATE: 88 BPM | SYSTOLIC BLOOD PRESSURE: 134 MMHG | WEIGHT: 181 LBS | OXYGEN SATURATION: 98 % | DIASTOLIC BLOOD PRESSURE: 86 MMHG | HEIGHT: 64 IN | TEMPERATURE: 98.4 F

## 2024-05-22 DIAGNOSIS — B96.89 ACUTE BACTERIAL SINUSITIS: Primary | ICD-10-CM

## 2024-05-22 DIAGNOSIS — J01.90 ACUTE BACTERIAL SINUSITIS: Primary | ICD-10-CM

## 2024-05-22 PROCEDURE — 99213 OFFICE O/P EST LOW 20 MIN: CPT | Performed by: NURSE PRACTITIONER

## 2024-05-22 RX ORDER — AZITHROMYCIN 250 MG/1
TABLET, FILM COATED ORAL
Qty: 6 TABLET | Refills: 0 | Status: SHIPPED | OUTPATIENT
Start: 2024-05-22 | End: 2024-06-01

## 2024-05-22 RX ORDER — AMOXICILLIN 875 MG/1
875 TABLET, COATED ORAL 2 TIMES DAILY
COMMUNITY
Start: 2024-05-19

## 2024-05-22 RX ORDER — PREDNISONE 10 MG/1
10 TABLET ORAL DAILY
COMMUNITY
Start: 2024-05-22 | End: 2024-05-26

## 2024-05-22 NOTE — PATIENT INSTRUCTIONS
Start nasal saline rinses, use flonase after rinse  Continue amoxicillin, add zpak  Continue drinking water  Decrease prednisone to 1 pill in the morning only

## 2024-05-22 NOTE — PROGRESS NOTES
Melissa Montilla (:  1985) is a 39 y.o. female,Established patient, here for evaluation of the following chief complaint(s):  Hypertension (Pt here due to elevated blood pressure. ) and Follow-up (Patient stated that she was seen at Urgent Care on 2024 was treated with Abx, cetirizine, and prednisone and still not feeling any better. //Dizziness and HA x 3 weeks. Urgent Care stated that she had ear infection. )      ASSESSMENT/PLAN:  1. Acute bacterial sinusitis  -     azithromycin (ZITHROMAX) 250 MG tablet; 500mg on day 1 followed by 250mg on days 2 - 5, Disp-6 tablet, R-0Normal      Patient Instructions   Start nasal saline rinses, use flonase after rinse  Continue amoxicillin, add zpak  Continue drinking water  Decrease prednisone to 1 pill in the morning only     Return in about 1 week (around 2024), or if symptoms worsen or fail to improve.    SUBJECTIVE/OBJECTIVE:  HPI  Headache and intermittent dizziness that is not resolving    Illness  The current episode started 1 to 4 weeks ago. The problem occurs constantly. The problem has been gradually worsening since onset. The pain is moderate. Nothing aggravates the symptoms. Associated symptoms include congestion, headaches, rhinorrhea, fatigue, a fever and coughing. Pertinent negatives include no decreased vision, double vision, ear discharge, ear pain, eye discharge, eye itching, eye pain, eye redness, hearing loss, mouth sores, photophobia, sore throat, stridor, swollen glands, URI, weight gain, weight loss, chest pain, shortness of breath, wheezing, abdominal pain, constipation, diarrhea, nausea, vomiting, joint pain, joint swelling, muscle aches, neck pain, neck stiffness or rash. Past treatments include one or more prescription drugs. The treatment provided mild relief. The fever has been present for 1 to 2 days. Her temperature was unmeasured prior to arrival. The cough is Non-productive. The cough is relieved by one or more OTC

## 2024-05-23 ASSESSMENT — ENCOUNTER SYMPTOMS
ABDOMINAL PAIN: 0
VOMITING: 0
EYE REDNESS: 0
CONSTIPATION: 0
PHOTOPHOBIA: 0
NAUSEA: 0
SHORTNESS OF BREATH: 0
SWOLLEN GLANDS: 0
SORE THROAT: 0
EYE ITCHING: 0
EYE PAIN: 0
DIARRHEA: 0
DOUBLE VISION: 0
COUGH: 1
EYE DISCHARGE: 0
RHINORRHEA: 1
WHEEZING: 0
STRIDOR: 0

## 2024-06-14 ENCOUNTER — OFFICE VISIT (OUTPATIENT)
Dept: FAMILY MEDICINE CLINIC | Age: 39
End: 2024-06-14
Payer: COMMERCIAL

## 2024-06-14 VITALS
OXYGEN SATURATION: 97 % | HEART RATE: 82 BPM | HEIGHT: 64 IN | BODY MASS INDEX: 30.56 KG/M2 | WEIGHT: 179 LBS | SYSTOLIC BLOOD PRESSURE: 120 MMHG | TEMPERATURE: 97.7 F | DIASTOLIC BLOOD PRESSURE: 76 MMHG

## 2024-06-14 DIAGNOSIS — R42 DIZZINESS: ICD-10-CM

## 2024-06-14 DIAGNOSIS — Z13.220 SCREENING FOR HYPERLIPIDEMIA: ICD-10-CM

## 2024-06-14 DIAGNOSIS — R42 DIZZINESS: Primary | ICD-10-CM

## 2024-06-14 DIAGNOSIS — J32.4 CHRONIC PANSINUSITIS: ICD-10-CM

## 2024-06-14 LAB
BASOPHILS # BLD: 0 K/UL (ref 0–0.2)
BASOPHILS NFR BLD: 1.2 %
DEPRECATED RDW RBC AUTO: 15.1 % (ref 12.4–15.4)
EOSINOPHIL # BLD: 0.1 K/UL (ref 0–0.6)
EOSINOPHIL NFR BLD: 3 %
HCT VFR BLD AUTO: 34.9 % (ref 36–48)
HGB BLD-MCNC: 11.4 G/DL (ref 12–16)
LYMPHOCYTES # BLD: 1.3 K/UL (ref 1–5.1)
LYMPHOCYTES NFR BLD: 38 %
MCH RBC QN AUTO: 27.2 PG (ref 26–34)
MCHC RBC AUTO-ENTMCNC: 32.7 G/DL (ref 31–36)
MCV RBC AUTO: 83.3 FL (ref 80–100)
MONOCYTES # BLD: 0.2 K/UL (ref 0–1.3)
MONOCYTES NFR BLD: 5.5 %
NEUTROPHILS # BLD: 1.7 K/UL (ref 1.7–7.7)
NEUTROPHILS NFR BLD: 52.3 %
PLATELET # BLD AUTO: 192 K/UL (ref 135–450)
PMV BLD AUTO: 9.8 FL (ref 5–10.5)
RBC # BLD AUTO: 4.2 M/UL (ref 4–5.2)
WBC # BLD AUTO: 3.3 K/UL (ref 4–11)

## 2024-06-14 PROCEDURE — 99213 OFFICE O/P EST LOW 20 MIN: CPT | Performed by: NURSE PRACTITIONER

## 2024-06-14 RX ORDER — MECLIZINE HYDROCHLORIDE 25 MG/1
25 TABLET ORAL 3 TIMES DAILY PRN
Qty: 15 TABLET | Refills: 0 | Status: SHIPPED | OUTPATIENT
Start: 2024-06-14 | End: 2024-06-24

## 2024-06-14 RX ORDER — BUTALBITAL, ACETAMINOPHEN AND CAFFEINE 300; 40; 50 MG/1; MG/1; MG/1
CAPSULE ORAL
COMMUNITY
Start: 2024-05-31

## 2024-06-14 RX ORDER — SULFAMETHOXAZOLE AND TRIMETHOPRIM 800; 160 MG/1; MG/1
TABLET ORAL
COMMUNITY
Start: 2024-05-31

## 2024-06-14 RX ORDER — GUAIFENESIN 600 MG/1
1200 TABLET, EXTENDED RELEASE ORAL 2 TIMES DAILY
Qty: 60 TABLET | Refills: 0 | Status: SHIPPED | OUTPATIENT
Start: 2024-06-14 | End: 2024-06-29

## 2024-06-14 ASSESSMENT — PATIENT HEALTH QUESTIONNAIRE - PHQ9
SUM OF ALL RESPONSES TO PHQ QUESTIONS 1-9: 0
1. LITTLE INTEREST OR PLEASURE IN DOING THINGS: NOT AT ALL
SUM OF ALL RESPONSES TO PHQ QUESTIONS 1-9: 0
SUM OF ALL RESPONSES TO PHQ9 QUESTIONS 1 & 2: 0
2. FEELING DOWN, DEPRESSED OR HOPELESS: NOT AT ALL
SUM OF ALL RESPONSES TO PHQ QUESTIONS 1-9: 0
SUM OF ALL RESPONSES TO PHQ QUESTIONS 1-9: 0

## 2024-06-14 ASSESSMENT — ENCOUNTER SYMPTOMS
SWOLLEN GLANDS: 0
ABDOMINAL PAIN: 0
VOMITING: 0
NAUSEA: 0
RHINORRHEA: 1
EYE DISCHARGE: 0
DIARRHEA: 0
DOUBLE VISION: 0
WHEEZING: 0
PHOTOPHOBIA: 0
CONSTIPATION: 0
STRIDOR: 0
SHORTNESS OF BREATH: 0
EYE ITCHING: 0
SORE THROAT: 0
EYE REDNESS: 0
COUGH: 1
EYE PAIN: 0

## 2024-06-14 ASSESSMENT — VISUAL ACUITY: OU: 1

## 2024-06-14 NOTE — ASSESSMENT & PLAN NOTE
Uncontrolled, changes made today: finish all of bactrim, can still use fioricet prn for headache, continue sinus rinse, flonase, add mucinex, and meclizine, schedule CT, and appt with ENT. ED precautions discussed

## 2024-06-14 NOTE — PROGRESS NOTES
sores and sore throat.    Eyes:  Negative for double vision, photophobia, pain, discharge, redness and itching.   Respiratory:  Positive for cough. Negative for shortness of breath, wheezing and stridor.    Cardiovascular:  Negative for chest pain.   Gastrointestinal:  Negative for abdominal pain, constipation, diarrhea, nausea and vomiting.   Musculoskeletal:  Negative for joint pain, joint swelling and neck pain.   Skin:  Negative for rash.   Neurological:  Positive for dizziness and headaches.     Vitals:    06/14/24 0805   BP: 120/76   Site: Left Upper Arm   Position: Sitting   Cuff Size: Medium Adult   Pulse: 82   Temp: 97.7 °F (36.5 °C)   TempSrc: Temporal   SpO2: 97%   Weight: 81.2 kg (179 lb)   Height: 1.626 m (5' 4\")      No results found for this or any previous visit (from the past 2016 hour(s)).     Wt Readings from Last 3 Encounters:   06/14/24 81.2 kg (179 lb)   05/22/24 82.1 kg (181 lb)   10/18/23 79.9 kg (176 lb 1.6 oz)        Physical Exam  Constitutional:       General: She is not in acute distress.     Appearance: Normal appearance.   HENT:      Head: Normocephalic and atraumatic.      Right Ear: Ear canal and external ear normal. A middle ear effusion is present. No hemotympanum. Tympanic membrane is erythematous. Tympanic membrane is not perforated or retracted.      Left Ear: Ear canal and external ear normal. A middle ear effusion is present. No hemotympanum. Tympanic membrane is not perforated, erythematous or retracted.      Nose:      Right Turbinates: Enlarged and swollen.      Left Turbinates: Enlarged and swollen.      Right Sinus: Maxillary sinus tenderness and frontal sinus tenderness present.      Left Sinus: Maxillary sinus tenderness and frontal sinus tenderness present.      Mouth/Throat:      Lips: Pink.      Mouth: Mucous membranes are moist.      Pharynx: Oropharynx is clear. Uvula midline. No pharyngeal swelling, oropharyngeal exudate or posterior oropharyngeal erythema.

## 2024-06-15 LAB
ALBUMIN SERPL-MCNC: 4.4 G/DL (ref 3.4–5)
ALBUMIN/GLOB SERPL: 1.5 {RATIO} (ref 1.1–2.2)
ALP SERPL-CCNC: 44 U/L (ref 40–129)
ALT SERPL-CCNC: 9 U/L (ref 10–40)
ANION GAP SERPL CALCULATED.3IONS-SCNC: 11 MMOL/L (ref 3–16)
AST SERPL-CCNC: 13 U/L (ref 15–37)
BILIRUB SERPL-MCNC: 0.3 MG/DL (ref 0–1)
BUN SERPL-MCNC: 10 MG/DL (ref 7–20)
CALCIUM SERPL-MCNC: 9 MG/DL (ref 8.3–10.6)
CHLORIDE SERPL-SCNC: 104 MMOL/L (ref 99–110)
CHOLEST SERPL-MCNC: 175 MG/DL (ref 0–199)
CO2 SERPL-SCNC: 24 MMOL/L (ref 21–32)
CREAT SERPL-MCNC: 0.8 MG/DL (ref 0.6–1.1)
GFR SERPLBLD CREATININE-BSD FMLA CKD-EPI: >90 ML/MIN/{1.73_M2}
GLUCOSE SERPL-MCNC: 95 MG/DL (ref 70–99)
HDLC SERPL-MCNC: 45 MG/DL (ref 40–60)
LDLC SERPL CALC-MCNC: 110 MG/DL
POTASSIUM SERPL-SCNC: 4.2 MMOL/L (ref 3.5–5.1)
PROT SERPL-MCNC: 7.3 G/DL (ref 6.4–8.2)
SODIUM SERPL-SCNC: 139 MMOL/L (ref 136–145)
TRIGL SERPL-MCNC: 101 MG/DL (ref 0–150)
VLDLC SERPL CALC-MCNC: 20 MG/DL

## 2024-06-18 DIAGNOSIS — D64.9 ANEMIA, UNSPECIFIED TYPE: ICD-10-CM

## 2024-06-18 LAB
FERRITIN SERPL IA-MCNC: 60.6 NG/ML (ref 15–150)
IRON SATN MFR SERPL: 26 % (ref 15–50)
IRON SERPL-MCNC: 81 UG/DL (ref 37–145)
TIBC SERPL-MCNC: 311 UG/DL (ref 260–445)

## 2024-06-19 ENCOUNTER — OFFICE VISIT (OUTPATIENT)
Dept: ENT CLINIC | Age: 39
End: 2024-06-19
Payer: COMMERCIAL

## 2024-06-19 VITALS
HEIGHT: 64 IN | HEART RATE: 74 BPM | WEIGHT: 181.4 LBS | DIASTOLIC BLOOD PRESSURE: 73 MMHG | TEMPERATURE: 99 F | BODY MASS INDEX: 30.97 KG/M2 | OXYGEN SATURATION: 98 % | SYSTOLIC BLOOD PRESSURE: 113 MMHG

## 2024-06-19 DIAGNOSIS — J32.9 RECURRENT SINUSITIS: Chronic | ICD-10-CM

## 2024-06-19 DIAGNOSIS — R42 DIZZINESS: Primary | Chronic | ICD-10-CM

## 2024-06-19 PROCEDURE — 99203 OFFICE O/P NEW LOW 30 MIN: CPT | Performed by: OTOLARYNGOLOGY

## 2024-06-19 NOTE — PROGRESS NOTES
[facility]   DIVISION OF OTOLARYNGOLOGY- HEAD & NECK SURGERY  [facility]           NEW PATIENT VISIT      PCP:  Rudy Noel APRN - CNP      REFERRED BY:   Rudy Noel APRN - CNP       CHIEF COMPLAINT    Chief Complaint   Patient presents with    Dizziness    Sinusitis       HISTORY OF PRESENT ILLNESS      Melissa Montilla is a 39 y.o. female who was referred for evaluation and treatment of Dizziness and Acute bacterial sinusitis.  \"Ears 4 weeks ago had pressure in ears, especially the right ear and developed into headache in top of head.  Sinusitis:   sneezing itching.   On antibiotics for about one month.  Urgent care, ear infection tx with antibiotic.  No better.  Back to rudy noel, something with ear nose and sinuses.  Last week rudy said left ear was better and right ear not better  Tried generic ear drops from [facility] and today pressure us not as bad.  [facility] ear ache drops.  No diagnosis of migraine headaches.  Only a little pressure in head today.  The dizziness only occurs along with the bad headache.  Decr need to sit and cannot stand.  Head is spinning.       , Seth    REVIEW OF SYSTEMS   Review of Systems   Constitutional:  Negative for fever and unexpected weight change.   HENT:  Positive for tinnitus. Negative for congestion, ear discharge, ear pain, hearing loss, rhinorrhea, sinus pain and sore throat.          PAST MEDICAL HISTORY    Past Medical History:   Diagnosis Date    Allergic rhinitis     Ectopic pregnancy        Past Surgical History:   Procedure Laterality Date    ECTOPIC PREGNANCY SURGERY  2012    tubal; treated surgically in [facility].     LAPAROTOMY  1987    remove swallowed object    SALPINGECTOMY Right 2012    ectopic       Current Outpatient Medications   Medication Sig Dispense Refill    sulfamethoxazole-trimethoprim (BACTRIM DS;SEPTRA DS) 800-160 MG per tablet TAKE 1 TABLET BY MOUTH TWICE DAILY FOR 10 DAYS      butalbital-APAP-caffeine

## 2024-06-27 ENCOUNTER — HOSPITAL ENCOUNTER (OUTPATIENT)
Dept: CT IMAGING | Age: 39
Discharge: HOME | End: 2024-06-27
Payer: COMMERCIAL

## 2024-06-27 DIAGNOSIS — R42 DIZZINESS: ICD-10-CM

## 2024-06-27 PROCEDURE — 70470 CT HEAD/BRAIN W/O & W/DYE: CPT

## 2024-06-27 PROCEDURE — 6360000004 HC RX CONTRAST MEDICATION: Performed by: NURSE PRACTITIONER

## 2024-06-27 RX ADMIN — IOPAMIDOL 75 ML: 755 INJECTION, SOLUTION INTRAVENOUS at 17:37

## 2024-07-12 ENCOUNTER — OFFICE VISIT (OUTPATIENT)
Dept: FAMILY MEDICINE CLINIC | Age: 39
End: 2024-07-12
Payer: COMMERCIAL

## 2024-07-12 VITALS
BODY MASS INDEX: 30.2 KG/M2 | WEIGHT: 176.9 LBS | HEART RATE: 68 BPM | TEMPERATURE: 98 F | DIASTOLIC BLOOD PRESSURE: 82 MMHG | HEIGHT: 64 IN | SYSTOLIC BLOOD PRESSURE: 120 MMHG | OXYGEN SATURATION: 98 %

## 2024-07-12 DIAGNOSIS — R00.2 PALPITATIONS: Primary | ICD-10-CM

## 2024-07-12 DIAGNOSIS — G47.9 SLEEP DISTURBANCE: ICD-10-CM

## 2024-07-12 PROCEDURE — 99214 OFFICE O/P EST MOD 30 MIN: CPT | Performed by: NURSE PRACTITIONER

## 2024-07-12 ASSESSMENT — VISUAL ACUITY: OU: 1

## 2024-07-12 ASSESSMENT — ENCOUNTER SYMPTOMS
SHORTNESS OF BREATH: 0
NAUSEA: 0
COUGH: 0
VOMITING: 0
STRIDOR: 0
WHEEZING: 0

## 2024-07-12 NOTE — PROGRESS NOTES
Melissa Montilla (:  1985) is a 39 y.o. female,Established patient, here for evaluation of the following chief complaint(s):  Results (Pt states that it feels like her heart will start racing and it feels like it shaking for the past 3 weeks and she can be just sitting and not doing anything )      ASSESSMENT/PLAN:  1. Palpitations  Assessment & Plan:   Reoccurring problem, previous ed workup non revealing. Exam neg. Repeat EKG and check holter monitor  Orders:  -     Cardiac holter monitor (1 day-2 day); Future  -     EKG 12 lead; Future  2. Sleep disturbance  Assessment & Plan:   r/o sleep apnea  Orders:  -     Ambulatory referral to Sleep Medicine      Patient Instructions   Get EKG and heart monitor, follow up in office to discuss results  Recommend getting sleep study to check for sleep apnea     Return in about 2 weeks (around 2024).    SUBJECTIVE/OBJECTIVE:  HPI  Pt seen for follow up of sinus pressure and dizziness. Reports sinus pressure and dizziness has resolved completely.     New concern of palpitations, no identified trigger, experiences chest tightness during episode. Resolves spontaneously.     Dizziness  The problem has been resolved. Pertinent negatives include no chest pain, chills, coughing, diaphoresis, fatigue, fever, headaches, nausea, numbness, vomiting or weakness.   Palpitations   This is a recurrent problem. The current episode started 1 to 4 weeks ago. The problem occurs intermittently. The problem has been waxing and waning. On average, each episode lasts 5 minutes. Nothing aggravates the symptoms. Associated symptoms include anxiety, chest fullness, dizziness and an irregular heartbeat. Pertinent negatives include no chest pain, coughing, diaphoresis, fever, malaise/fatigue, nausea, near-syncope, numbness, shortness of breath, syncope, vomiting or weakness. She has tried nothing for the symptoms. Risk factors include obesity. Her past medical history is

## 2024-07-12 NOTE — ASSESSMENT & PLAN NOTE
Reoccurring problem, previous ed workup non revealing. Exam neg. Repeat EKG and check holter monitor

## 2024-07-12 NOTE — PATIENT INSTRUCTIONS
Get EKG and heart monitor, follow up in office to discuss results  Recommend getting sleep study to check for sleep apnea

## 2024-07-30 ENCOUNTER — HOSPITAL ENCOUNTER (OUTPATIENT)
Age: 39
Discharge: HOME | End: 2024-08-01

## 2024-07-30 DIAGNOSIS — R00.2 PALPITATIONS: ICD-10-CM

## 2024-07-30 PROCEDURE — 93225 XTRNL ECG REC<48 HRS REC: CPT

## 2024-08-09 LAB
ACQUISITION DURATION: NORMAL S
AVERAGE HEART RATE: 87 BPM
HOOKUP DATE: NORMAL
HOOKUP TIME: NORMAL
MAX HEART RATE TIME/DATE: NORMAL
MAX HEART RATE: 130 BPM
MIN HEART RATE TIME/DATE: NORMAL
MIN HEART RATE: 60 BPM
NUMBER OF QRS COMPLEXES: NORMAL
NUMBER OF SUPRAVENTRICULAR COUPLETS: 0
NUMBER OF SUPRAVENTRICULAR ECTOPICS: 2
NUMBER OF SUPRAVENTRICULAR ISOLATED BEATS: 2
NUMBER OF VENTRICULAR BIGEMINAL CYCLES: 0
NUMBER OF VENTRICULAR COUPLETS: 0
NUMBER OF VENTRICULAR ECTOPICS: 0

## 2024-09-13 ENCOUNTER — OFFICE VISIT (OUTPATIENT)
Dept: FAMILY MEDICINE CLINIC | Age: 39
End: 2024-09-13
Payer: COMMERCIAL

## 2024-09-13 VITALS
BODY MASS INDEX: 30.39 KG/M2 | DIASTOLIC BLOOD PRESSURE: 82 MMHG | RESPIRATION RATE: 16 BRPM | HEART RATE: 74 BPM | SYSTOLIC BLOOD PRESSURE: 120 MMHG | HEIGHT: 64 IN | OXYGEN SATURATION: 98 % | WEIGHT: 178 LBS

## 2024-09-13 DIAGNOSIS — R10.2 PELVIC PAIN: Primary | ICD-10-CM

## 2024-09-13 LAB
BILIRUBIN, POC: NORMAL
BLOOD URINE, POC: NORMAL
CLARITY, POC: NORMAL
COLOR, POC: YELLOW
CONTROL: NORMAL
GLUCOSE URINE, POC: NORMAL MG/DL
KETONES, POC: NORMAL MG/DL
LEUKOCYTE EST, POC: NORMAL
NITRITE, POC: NORMAL
PH, POC: 705
PREGNANCY TEST URINE, POC: NEGATIVE
PROTEIN, POC: NORMAL MG/DL
SPECIFIC GRAVITY, POC: 1.02
UROBILINOGEN, POC: 1 MG/DL

## 2024-09-13 PROCEDURE — 99213 OFFICE O/P EST LOW 20 MIN: CPT | Performed by: NURSE PRACTITIONER

## 2024-09-13 PROCEDURE — 81002 URINALYSIS NONAUTO W/O SCOPE: CPT | Performed by: NURSE PRACTITIONER

## 2024-09-13 PROCEDURE — 81025 URINE PREGNANCY TEST: CPT | Performed by: NURSE PRACTITIONER

## 2024-09-13 RX ORDER — TRAMADOL HYDROCHLORIDE 50 MG/1
50 TABLET ORAL EVERY 4 HOURS PRN
Qty: 42 TABLET | Refills: 0 | Status: SHIPPED | OUTPATIENT
Start: 2024-09-13 | End: 2024-09-20

## 2024-09-18 ENCOUNTER — TELEPHONE (OUTPATIENT)
Dept: FAMILY MEDICINE CLINIC | Age: 39
End: 2024-09-18

## 2024-09-18 ENCOUNTER — HOSPITAL ENCOUNTER (OUTPATIENT)
Dept: ULTRASOUND IMAGING | Age: 39
Discharge: HOME | End: 2024-09-18
Payer: COMMERCIAL

## 2024-09-18 DIAGNOSIS — R10.2 PELVIC PAIN: Primary | ICD-10-CM

## 2024-09-18 DIAGNOSIS — R10.2 PELVIC PAIN: ICD-10-CM

## 2024-09-18 PROCEDURE — 76856 US EXAM PELVIC COMPLETE: CPT

## 2024-09-23 ASSESSMENT — ENCOUNTER SYMPTOMS
ABDOMINAL DISTENTION: 0
NAUSEA: 0
WHEEZING: 0
CONSTIPATION: 0
BLOOD IN STOOL: 0
SHORTNESS OF BREATH: 0
VOMITING: 0
DIARRHEA: 0
CHEST TIGHTNESS: 0

## 2024-10-08 ENCOUNTER — OFFICE VISIT (OUTPATIENT)
Dept: FAMILY MEDICINE CLINIC | Age: 39
End: 2024-10-08
Payer: COMMERCIAL

## 2024-10-08 VITALS
BODY MASS INDEX: 30.35 KG/M2 | HEIGHT: 64 IN | DIASTOLIC BLOOD PRESSURE: 70 MMHG | TEMPERATURE: 97.8 F | OXYGEN SATURATION: 98 % | SYSTOLIC BLOOD PRESSURE: 130 MMHG | WEIGHT: 177.8 LBS | HEART RATE: 66 BPM

## 2024-10-08 DIAGNOSIS — R03.0 BLOOD PRESSURE ELEVATED WITHOUT HISTORY OF HTN: ICD-10-CM

## 2024-10-08 DIAGNOSIS — G43.009 MIGRAINE WITHOUT AURA AND WITHOUT STATUS MIGRAINOSUS, NOT INTRACTABLE: Primary | ICD-10-CM

## 2024-10-08 PROCEDURE — 99214 OFFICE O/P EST MOD 30 MIN: CPT | Performed by: NURSE PRACTITIONER

## 2024-10-08 PROCEDURE — G2211 COMPLEX E/M VISIT ADD ON: HCPCS | Performed by: NURSE PRACTITIONER

## 2024-10-08 RX ORDER — BUTALBITAL AND ACETAMINOPHEN 300; 50 MG/1; MG/1
TABLET ORAL
COMMUNITY

## 2024-10-08 RX ORDER — PROPRANOLOL HCL 60 MG
60 CAPSULE, EXTENDED RELEASE 24HR ORAL DAILY
Qty: 30 CAPSULE | Refills: 0 | Status: SHIPPED | OUTPATIENT
Start: 2024-10-08

## 2024-10-08 SDOH — ECONOMIC STABILITY: FOOD INSECURITY: WITHIN THE PAST 12 MONTHS, THE FOOD YOU BOUGHT JUST DIDN'T LAST AND YOU DIDN'T HAVE MONEY TO GET MORE.: NEVER TRUE

## 2024-10-08 SDOH — ECONOMIC STABILITY: FOOD INSECURITY: WITHIN THE PAST 12 MONTHS, YOU WORRIED THAT YOUR FOOD WOULD RUN OUT BEFORE YOU GOT MONEY TO BUY MORE.: NEVER TRUE

## 2024-10-08 SDOH — ECONOMIC STABILITY: INCOME INSECURITY: HOW HARD IS IT FOR YOU TO PAY FOR THE VERY BASICS LIKE FOOD, HOUSING, MEDICAL CARE, AND HEATING?: NOT HARD AT ALL

## 2024-10-08 ASSESSMENT — ENCOUNTER SYMPTOMS
SHORTNESS OF BREATH: 0
CHEST TIGHTNESS: 0
ABDOMINAL PAIN: 0
COUGH: 0
WHEEZING: 0

## 2024-10-08 ASSESSMENT — PATIENT HEALTH QUESTIONNAIRE - PHQ9
SUM OF ALL RESPONSES TO PHQ QUESTIONS 1-9: 0
2. FEELING DOWN, DEPRESSED OR HOPELESS: NOT AT ALL
1. LITTLE INTEREST OR PLEASURE IN DOING THINGS: NOT AT ALL
SUM OF ALL RESPONSES TO PHQ9 QUESTIONS 1 & 2: 0

## 2024-10-08 NOTE — PATIENT INSTRUCTIONS
[facility] - Michael Awan MD  [address]  [address] [place] [address]  phone [phone] fax [phone]

## 2024-10-08 NOTE — PROGRESS NOTES
Stability: Unknown (10/8/2024)    Housing Stability Vital Sign     Unable to Pay for Housing in the Last Year: Not on file     Number of Times Moved in the Last Year: Not on file     Homeless in the Last Year: No         Review of Systems   Constitutional:  Negative for activity change and fatigue.   HENT:  Negative for nosebleeds and tinnitus.    Eyes:  Negative for visual disturbance.   Respiratory:  Negative for cough, chest tightness, shortness of breath and wheezing.    Cardiovascular:  Negative for chest pain, palpitations and leg swelling.   Gastrointestinal:  Negative for abdominal pain.   Genitourinary:  Positive for dyspareunia.   Skin:  Negative for rash.   Neurological:  Negative for dizziness, tremors, syncope, speech difficulty, weakness, numbness and headaches.     Vitals:    10/08/24 1322   BP: 130/70   Pulse: 66   Temp: 97.8 °F (36.6 °C)   SpO2: 98%   Weight: 80.6 kg (177 lb 12.8 oz)   Height: 1.626 m (5' 4\")      Recent Results (from the past 2016 hour(s))   Cardiac holter monitor (1 day-2 day)    Collection Time: 07/30/24 12:32 PM   Result Value Ref Range    Hookup Date [id number]     Hookup Time 160326     Acquisition Duration 520193 S    Number Of QRS Complexes 505606     Number Of Ventricular Ectopics 0     Number Of Ventricular Bigeminal Cycles 0     Number Of Ventricular Couplets 0     Number Of Superventricular Ectopics 2     Number Of Suptaventricular Isolated Beats 2     Number Of Supraventricular Couplets 0     Average Heart Rate 87 BPM    Max Heart Rate 130 BPM    Min Heart Rate 60 BPM    Max Heart Rate Time/Date [id number]     Min Heart Rate Time/Date [id number]    POCT Urinalysis no Micro    Collection Time: 09/13/24 12:46 PM   Result Value Ref Range    Color, UA yellow     Clarity, UA semi-clear     Glucose, UA POC neg mg/dL    Bilirubin, UA neg     Ketones, UA neg mg/dL    Spec Grav, UA 1.025     Blood, UA POC neg     pH,      Protein, UA POC trace mg/dL    Urobilinogen,

## 2024-10-08 NOTE — ASSESSMENT & PLAN NOTE
Chronic, worsening (exacerbation), start propanolol for preventative therapy and bp control, recommend pt call to schedule sleep study, information provided

## 2024-10-14 ENCOUNTER — OFFICE VISIT (OUTPATIENT)
Dept: FAMILY MEDICINE CLINIC | Age: 39
End: 2024-10-14
Payer: COMMERCIAL

## 2024-10-14 VITALS
HEART RATE: 74 BPM | SYSTOLIC BLOOD PRESSURE: 130 MMHG | OXYGEN SATURATION: 99 % | HEIGHT: 64 IN | RESPIRATION RATE: 16 BRPM | TEMPERATURE: 97.6 F | DIASTOLIC BLOOD PRESSURE: 88 MMHG | WEIGHT: 177 LBS | BODY MASS INDEX: 30.22 KG/M2

## 2024-10-14 DIAGNOSIS — R03.0 BLOOD PRESSURE ELEVATED WITHOUT HISTORY OF HTN: Primary | ICD-10-CM

## 2024-10-14 DIAGNOSIS — G43.009 MIGRAINE WITHOUT AURA AND WITHOUT STATUS MIGRAINOSUS, NOT INTRACTABLE: ICD-10-CM

## 2024-10-14 PROCEDURE — 99214 OFFICE O/P EST MOD 30 MIN: CPT | Performed by: NURSE PRACTITIONER

## 2024-10-14 RX ORDER — PROPRANOLOL HCL 10 MG
10 TABLET ORAL 2 TIMES DAILY
Qty: 60 TABLET | Refills: 0 | Status: SHIPPED | OUTPATIENT
Start: 2024-10-14 | End: 2024-10-16

## 2024-10-14 ASSESSMENT — ENCOUNTER SYMPTOMS
ABDOMINAL PAIN: 0
CHEST TIGHTNESS: 0
SHORTNESS OF BREATH: 0
WHEEZING: 0
COUGH: 0

## 2024-10-14 NOTE — PATIENT INSTRUCTIONS
Switch to lower dose propanolol, take twice daily. Hold if blood pressures is less than 120/80  Follow up in 4 weeks, or sooner if headache is not getting better

## 2024-10-14 NOTE — ASSESSMENT & PLAN NOTE
Chronic, not at goal (unstable), bp normal in office, rec bringing in home monitor at next visit, will change to propanolol 10mg

## 2024-10-14 NOTE — ASSESSMENT & PLAN NOTE
Chronic, not at goal (unstable), change propanolol dose, may benefit from trial of triptan if continues  recommend sleep study and eye exam

## 2024-10-14 NOTE — PROGRESS NOTES
Melissa Montilla (:  1985) is a 39 y.o. female,Established patient, here for evaluation of the following chief complaint(s):  Other (Discuss medication)      ASSESSMENT/PLAN:  1. Blood pressure elevated without history of HTN  Assessment & Plan:   Chronic, not at goal (unstable), bp normal in office, rec bringing in home monitor at next visit, will change to propanolol 10mg  Orders:  -     propranolol (INDERAL) 10 MG tablet; Take 1 tablet by mouth 2 times daily Hold for bp less than 120/80, Disp-60 tablet, R-0Normal  2. Migraine without aura and without status migrainosus, not intractable  Assessment & Plan:   Chronic, not at goal (unstable), change propanolol dose, may benefit from trial of triptan if continues  recommend sleep study and eye exam  Orders:  -     propranolol (INDERAL) 10 MG tablet; Take 1 tablet by mouth 2 times daily Hold for bp less than 120/80, Disp-60 tablet, R-0Normal      Patient Instructions   Switch to lower dose propanolol, take twice daily. Hold if blood pressures is less than 120/80  Follow up in 4 weeks, or sooner if headache is not getting better       Return in about 4 weeks (around 2024).    SUBJECTIVE/OBJECTIVE:  HPI  Follow up for headache  Started propanolol, having side effect of dizziness, stopped 3 days ago, headahe somewhat better but still present.  Bp at home elevated 160/90 on home machine    Would like to try lower dose of propanolol    Hypertension  This is a chronic problem. The current episode started more than 1 month ago. The problem has been waxing and waning since onset. The problem is uncontrolled. Associated symptoms include headaches. Pertinent negatives include no chest pain, palpitations or shortness of breath. There are no associated agents to hypertension.   Headache  Headache pattern:  Headache sometimes there, sometimes not at all  Initial event:  None  Other event details:  Believes bp is elevated with headache  Recent changes:

## 2024-10-15 PROCEDURE — 99284 EMERGENCY DEPT VISIT MOD MDM: CPT

## 2024-10-16 ENCOUNTER — HOSPITAL ENCOUNTER (EMERGENCY)
Age: 39
Discharge: HOME | End: 2024-10-16
Attending: EMERGENCY MEDICINE
Payer: COMMERCIAL

## 2024-10-16 ENCOUNTER — OFFICE VISIT (OUTPATIENT)
Dept: FAMILY MEDICINE CLINIC | Age: 39
End: 2024-10-16

## 2024-10-16 ENCOUNTER — APPOINTMENT (OUTPATIENT)
Dept: CT IMAGING | Age: 39
End: 2024-10-16
Payer: COMMERCIAL

## 2024-10-16 VITALS
TEMPERATURE: 97.9 F | DIASTOLIC BLOOD PRESSURE: 90 MMHG | HEART RATE: 88 BPM | BODY MASS INDEX: 30.58 KG/M2 | HEIGHT: 64 IN | OXYGEN SATURATION: 100 % | RESPIRATION RATE: 16 BRPM | WEIGHT: 179.12 LBS | SYSTOLIC BLOOD PRESSURE: 149 MMHG

## 2024-10-16 VITALS
WEIGHT: 179.8 LBS | HEART RATE: 76 BPM | OXYGEN SATURATION: 100 % | RESPIRATION RATE: 16 BRPM | HEIGHT: 64 IN | BODY MASS INDEX: 30.7 KG/M2 | TEMPERATURE: 97.7 F | SYSTOLIC BLOOD PRESSURE: 151 MMHG | DIASTOLIC BLOOD PRESSURE: 93 MMHG

## 2024-10-16 DIAGNOSIS — I10 HYPERTENSION, UNSPECIFIED TYPE: Primary | ICD-10-CM

## 2024-10-16 DIAGNOSIS — G43.009 MIGRAINE WITHOUT AURA AND WITHOUT STATUS MIGRAINOSUS, NOT INTRACTABLE: ICD-10-CM

## 2024-10-16 DIAGNOSIS — I10 PRIMARY HYPERTENSION: Primary | ICD-10-CM

## 2024-10-16 DIAGNOSIS — R51.9 NONINTRACTABLE HEADACHE, UNSPECIFIED CHRONICITY PATTERN, UNSPECIFIED HEADACHE TYPE: ICD-10-CM

## 2024-10-16 LAB
ALBUMIN SERPL-MCNC: 4.2 G/DL (ref 3.4–5)
ALBUMIN/GLOB SERPL: 1.3 {RATIO} (ref 1.1–2.2)
ALP SERPL-CCNC: 45 U/L (ref 40–129)
ALT SERPL-CCNC: 20 U/L (ref 10–40)
ANION GAP SERPL CALCULATED.3IONS-SCNC: 11 MMOL/L (ref 3–16)
AST SERPL-CCNC: 24 U/L (ref 15–37)
BASOPHILS # BLD: 0.1 K/UL (ref 0–0.2)
BASOPHILS NFR BLD: 1.3 %
BILIRUB SERPL-MCNC: <0.2 MG/DL (ref 0–1)
BILIRUBIN, POC: NORMAL
BLOOD URINE, POC: NORMAL
BUN SERPL-MCNC: 16 MG/DL (ref 7–20)
CALCIUM SERPL-MCNC: 9 MG/DL (ref 8.3–10.6)
CHLORIDE SERPL-SCNC: 103 MMOL/L (ref 99–110)
CLARITY, POC: NORMAL
CO2 SERPL-SCNC: 22 MMOL/L (ref 21–32)
COLOR, POC: YELLOW
CREAT SERPL-MCNC: 0.8 MG/DL (ref 0.6–1.1)
CREATININE URINE POCT: 300
DEPRECATED RDW RBC AUTO: 15.3 % (ref 12.4–15.4)
EOSINOPHIL # BLD: 0.3 K/UL (ref 0–0.6)
EOSINOPHIL NFR BLD: 7 %
GFR SERPLBLD CREATININE-BSD FMLA CKD-EPI: >90 ML/MIN/{1.73_M2}
GLUCOSE SERPL-MCNC: 101 MG/DL (ref 70–99)
GLUCOSE URINE, POC: NORMAL MG/DL
HCT VFR BLD AUTO: 36.5 % (ref 36–48)
HGB BLD-MCNC: 11.8 G/DL (ref 12–16)
KETONES, POC: NORMAL MG/DL
LEUKOCYTE EST, POC: NORMAL
LYMPHOCYTES # BLD: 1.7 K/UL (ref 1–5.1)
LYMPHOCYTES NFR BLD: 35.2 %
MCH RBC QN AUTO: 27.1 PG (ref 26–34)
MCHC RBC AUTO-ENTMCNC: 32.4 G/DL (ref 31–36)
MCV RBC AUTO: 83.8 FL (ref 80–100)
MICROALBUMIN/CREAT 24H UR: 30 MG/DL
MICROALBUMIN/CREAT UR-RTO: <30 MG/G
MONOCYTES # BLD: 0.3 K/UL (ref 0–1.3)
MONOCYTES NFR BLD: 6.1 %
NEUTROPHILS # BLD: 2.4 K/UL (ref 1.7–7.7)
NEUTROPHILS NFR BLD: 50.4 %
NITRITE, POC: NORMAL
PH, POC: 6
PLATELET # BLD AUTO: 221 K/UL (ref 135–450)
PMV BLD AUTO: 9.2 FL (ref 5–10.5)
POTASSIUM SERPL-SCNC: 4.4 MMOL/L (ref 3.5–5.1)
PROT SERPL-MCNC: 7.4 G/DL (ref 6.4–8.2)
PROTEIN, POC: NORMAL MG/DL
RBC # BLD AUTO: 4.35 M/UL (ref 4–5.2)
SODIUM SERPL-SCNC: 136 MMOL/L (ref 136–145)
SPECIFIC GRAVITY, POC: 1.02
TROPONIN, HIGH SENSITIVITY: <6 NG/L (ref 0–14)
UROBILINOGEN, POC: 0.2 MG/DL
WBC # BLD AUTO: 4.8 K/UL (ref 4–11)

## 2024-10-16 PROCEDURE — 96375 TX/PRO/DX INJ NEW DRUG ADDON: CPT

## 2024-10-16 PROCEDURE — 70450 CT HEAD/BRAIN W/O DYE: CPT

## 2024-10-16 PROCEDURE — 6360000002 HC RX W HCPCS: Performed by: EMERGENCY MEDICINE

## 2024-10-16 PROCEDURE — 84484 ASSAY OF TROPONIN QUANT: CPT

## 2024-10-16 PROCEDURE — 85025 COMPLETE CBC W/AUTO DIFF WBC: CPT

## 2024-10-16 PROCEDURE — 96374 THER/PROPH/DIAG INJ IV PUSH: CPT

## 2024-10-16 PROCEDURE — 80053 COMPREHEN METABOLIC PANEL: CPT

## 2024-10-16 RX ORDER — SUCRALFATE ORAL 1 G/10ML
1 SUSPENSION ORAL EVERY 6 HOURS
COMMUNITY
End: 2024-10-16 | Stop reason: ALTCHOICE

## 2024-10-16 RX ORDER — BUTALBITAL, ACETAMINOPHEN AND CAFFEINE 300; 40; 50 MG/1; MG/1; MG/1
CAPSULE ORAL
COMMUNITY
Start: 2024-10-08 | End: 2024-10-16 | Stop reason: CLARIF

## 2024-10-16 RX ORDER — SUMATRIPTAN 50 MG/1
TABLET, FILM COATED ORAL
Qty: 10 TABLET | Refills: 1 | Status: SHIPPED | OUTPATIENT
Start: 2024-10-16

## 2024-10-16 RX ORDER — KETOROLAC TROMETHAMINE 15 MG/ML
15 INJECTION, SOLUTION INTRAMUSCULAR; INTRAVENOUS ONCE
Status: COMPLETED | OUTPATIENT
Start: 2024-10-16 | End: 2024-10-16

## 2024-10-16 RX ORDER — LABETALOL HYDROCHLORIDE 5 MG/ML
10 INJECTION, SOLUTION INTRAVENOUS ONCE
Status: COMPLETED | OUTPATIENT
Start: 2024-10-16 | End: 2024-10-16

## 2024-10-16 RX ORDER — HYDROCHLOROTHIAZIDE 25 MG/1
25 TABLET ORAL EVERY MORNING
Qty: 30 TABLET | Refills: 0 | Status: SHIPPED | OUTPATIENT
Start: 2024-10-16

## 2024-10-16 RX ADMIN — KETOROLAC TROMETHAMINE 15 MG: 15 INJECTION, SOLUTION INTRAMUSCULAR; INTRAVENOUS at 01:32

## 2024-10-16 RX ADMIN — LABETALOL HYDROCHLORIDE 10 MG: 5 INJECTION, SOLUTION INTRAVENOUS at 01:32

## 2024-10-16 ASSESSMENT — LIFESTYLE VARIABLES
HOW OFTEN DO YOU HAVE A DRINK CONTAINING ALCOHOL: NEVER
HOW MANY STANDARD DRINKS CONTAINING ALCOHOL DO YOU HAVE ON A TYPICAL DAY: PATIENT DOES NOT DRINK

## 2024-10-16 ASSESSMENT — PAIN - FUNCTIONAL ASSESSMENT: PAIN_FUNCTIONAL_ASSESSMENT: NONE - DENIES PAIN

## 2024-10-16 NOTE — DISCHARGE INSTR - COC
Assessment:  Last Vital Signs: BP (!) 166/98   Pulse 76   Temp 97.7 °F (36.5 °C) (Oral)   Resp 16   Ht 1.626 m (5' 4\")   Wt 81.6 kg (179 lb 12.8 oz)   LMP 10/01/2024 (Approximate)   SpO2 100%   BMI 30.86 kg/m²     Last documented pain score (0-10 scale):    Last Weight:   Wt Readings from Last 1 Encounters:   10/16/24 81.6 kg (179 lb 12.8 oz)     Mental Status:  {IP PT MENTAL STATUS:}    IV Access:  { JUVE IV ACCESS:567619496}    Nursing Mobility/ADLs:  Walking   {CHP DME ADLs:388556616}  Transfer  {CHP DME ADLs:929032764}  Bathing  {CHP DME ADLs:779118125}  Dressing  {CHP DME ADLs:843598032}  Toileting  {CHP DME ADLs:996149257}  Feeding  {CHP DME ADLs:555328996}  Med Admin  {CHP DME ADLs:829809475}  Med Delivery   { JUVE MED Delivery:774051657}    Wound Care Documentation and Therapy:        Elimination:  Continence:   Bowel: {YES / NO:}  Bladder: {YES / NO:}  Urinary Catheter: {Urinary Catheter:227079156}   Colostomy/Ileostomy/Ileal Conduit: {YES / NO:}       Date of Last BM: ***  No intake or output data in the 24 hours ending 10/16/24 0046  No intake/output data recorded.    Safety Concerns:     { JUVE Safety Concerns:525596642}    Impairments/Disabilities:      { JUVE Impairments/Disabilities:712176222}    Nutrition Therapy:  Current Nutrition Therapy:   { JUVE Diet List:905870718}    Routes of Feeding: {CHP DME Other Feedings:911819730}  Liquids: {Slp liquid thickness:85873}  Daily Fluid Restriction: {CHP DME Yes amt example:926886409}  Last Modified Barium Swallow with Video (Video Swallowing Test): {Done Not Done Date:}    Treatments at the Time of Hospital Discharge:   Respiratory Treatments: ***  Oxygen Therapy:  {Therapy; copd oxygen:00789}  Ventilator:    { CC Vent List:915778113}    Rehab Therapies: {THERAPEUTIC INTERVENTION:9885730859}  Weight Bearing Status/Restrictions: { CC Weight Bearin}  Other Medical Equipment (for information only, NOT a

## 2024-10-16 NOTE — ED PROVIDER NOTES
normal  QTc is normal  LVH noted  No specific ST-T wave changes appreciated.  No evidence of acute ischemia.     LABS  I have personally reviewed all labs for this visit.   Results for orders placed or performed during the hospital encounter of 10/16/24   CBC with Auto Differential   Result Value Ref Range    WBC 4.8 4.0 - 11.0 K/uL    RBC 4.35 4.00 - 5.20 M/uL    Hemoglobin 11.8 (L) 12.0 - 16.0 g/dL    Hematocrit 36.5 36.0 - 48.0 %    MCV 83.8 80.0 - 100.0 fL    MCH 27.1 26.0 - 34.0 pg    MCHC 32.4 31.0 - 36.0 g/dL    RDW 15.3 12.4 - 15.4 %    Platelets 221 135 - 450 K/uL    MPV 9.2 5.0 - 10.5 fL    Neutrophils % 50.4 %    Lymphocytes % 35.2 %    Monocytes % 6.1 %    Eosinophils % 7.0 %    Basophils % 1.3 %    Neutrophils Absolute 2.4 1.7 - 7.7 K/uL    Lymphocytes Absolute 1.7 1.0 - 5.1 K/uL    Monocytes Absolute 0.3 0.0 - 1.3 K/uL    Eosinophils Absolute 0.3 0.0 - 0.6 K/uL    Basophils Absolute 0.1 0.0 - 0.2 K/uL   CMP w/ Reflex to MG   Result Value Ref Range    Sodium 136 136 - 145 mmol/L    Potassium reflex Magnesium 4.4 3.5 - 5.1 mmol/L    Chloride 103 99 - 110 mmol/L    CO2 22 21 - 32 mmol/L    Anion Gap 11 3 - 16    Glucose 101 (H) 70 - 99 mg/dL    BUN 16 7 - 20 mg/dL    Creatinine 0.8 0.6 - 1.1 mg/dL    Est, Glom Filt Rate >90 >60    Calcium 9.0 8.3 - 10.6 mg/dL    Total Protein 7.4 6.4 - 8.2 g/dL    Albumin 4.2 3.4 - 5.0 g/dL    Albumin/Globulin Ratio 1.3 1.1 - 2.2    Total Bilirubin <0.2 0.0 - 1.0 mg/dL    Alkaline Phosphatase 45 40 - 129 U/L    ALT 20 10 - 40 U/L    AST 24 15 - 37 U/L   Troponin   Result Value Ref Range    Troponin, High Sensitivity <6 0 - 14 ng/L       RADIOLOGY  Any applicable radiology studies including x-ray, CT, MRI, and/or ultrasound, were reviewed independently by me in addition to the radiologist.  I reviewed all radiology images and reports as well from this evaluation.  CT Head W/O Contrast   Final Result   No acute intracranial abnormality.               ED COURSE/MDM  Old

## 2024-10-16 NOTE — PATIENT INSTRUCTIONS
Start water pill to help decrease blood pressure  Bring food diary/headache/bp log to next appt  Can try Imitrex medication as needed for headache

## 2024-10-16 NOTE — PROGRESS NOTES
Value Ref Range    WBC 4.8 4.0 - 11.0 K/uL    RBC 4.35 4.00 - 5.20 M/uL    Hemoglobin 11.8 (L) 12.0 - 16.0 g/dL    Hematocrit 36.5 36.0 - 48.0 %    MCV 83.8 80.0 - 100.0 fL    MCH 27.1 26.0 - 34.0 pg    MCHC 32.4 31.0 - 36.0 g/dL    RDW 15.3 12.4 - 15.4 %    Platelets 221 135 - 450 K/uL    MPV 9.2 5.0 - 10.5 fL    Neutrophils % 50.4 %    Lymphocytes % 35.2 %    Monocytes % 6.1 %    Eosinophils % 7.0 %    Basophils % 1.3 %    Neutrophils Absolute 2.4 1.7 - 7.7 K/uL    Lymphocytes Absolute 1.7 1.0 - 5.1 K/uL    Monocytes Absolute 0.3 0.0 - 1.3 K/uL    Eosinophils Absolute 0.3 0.0 - 0.6 K/uL    Basophils Absolute 0.1 0.0 - 0.2 K/uL   CMP w/ Reflex to MG    Collection Time: 10/16/24 12:52 AM   Result Value Ref Range    Sodium 136 136 - 145 mmol/L    Potassium reflex Magnesium 4.4 3.5 - 5.1 mmol/L    Chloride 103 99 - 110 mmol/L    CO2 22 21 - 32 mmol/L    Anion Gap 11 3 - 16    Glucose 101 (H) 70 - 99 mg/dL    BUN 16 7 - 20 mg/dL    Creatinine 0.8 0.6 - 1.1 mg/dL    Est, Glom Filt Rate >90 >60    Calcium 9.0 8.3 - 10.6 mg/dL    Total Protein 7.4 6.4 - 8.2 g/dL    Albumin 4.2 3.4 - 5.0 g/dL    Albumin/Globulin Ratio 1.3 1.1 - 2.2    Total Bilirubin <0.2 0.0 - 1.0 mg/dL    Alkaline Phosphatase 45 40 - 129 U/L    ALT 20 10 - 40 U/L    AST 24 15 - 37 U/L   Troponin    Collection Time: 10/16/24 12:52 AM   Result Value Ref Range    Troponin, High Sensitivity <6 0 - 14 ng/L   POCT Urinalysis no Micro    Collection Time: 10/16/24  9:35 AM   Result Value Ref Range    Color, UA yellow     Clarity, UA semi-clear     Glucose, UA POC neg mg/dL    Bilirubin, UA neg     Ketones, UA neg mg/dL    Spec Grav, UA 1.025     Blood, UA POC neg     pH, UA 6.0     Protein, UA POC neg mg/dL    Urobilinogen, UA 0.2 mg/dL    Leukocytes, UA neg     Nitrite, UA neg    POCT microalbumin    Collection Time: 10/16/24  9:37 AM   Result Value Ref Range    Microalb, Ur 30 MG/DL    Creatinine Ur POCT 300     Microalb/Creat Ratio <30 mg/g        Wt Readings

## 2024-10-17 PROBLEM — R03.0 BLOOD PRESSURE ELEVATED WITHOUT HISTORY OF HTN: Status: RESOLVED | Noted: 2024-10-08 | Resolved: 2024-10-17

## 2024-10-17 PROBLEM — I10 PRIMARY HYPERTENSION: Status: ACTIVE | Noted: 2024-10-17

## 2024-10-17 ASSESSMENT — ENCOUNTER SYMPTOMS
COUGH: 0
ABDOMINAL PAIN: 0
CHEST TIGHTNESS: 0
WHEEZING: 0
SHORTNESS OF BREATH: 0

## 2024-10-17 NOTE — ASSESSMENT & PLAN NOTE
Chronic, not at goal (unstable), d/c propanolol, start triptan prn  recommend sleep study and eye exam

## 2024-10-18 ENCOUNTER — OFFICE VISIT (OUTPATIENT)
Dept: FAMILY MEDICINE CLINIC | Age: 39
End: 2024-10-18
Payer: COMMERCIAL

## 2024-10-18 VITALS
TEMPERATURE: 98.7 F | HEART RATE: 85 BPM | WEIGHT: 179 LBS | DIASTOLIC BLOOD PRESSURE: 82 MMHG | OXYGEN SATURATION: 98 % | SYSTOLIC BLOOD PRESSURE: 130 MMHG | HEIGHT: 64 IN | BODY MASS INDEX: 30.56 KG/M2

## 2024-10-18 DIAGNOSIS — R51.9 BENIGN HEADACHE: ICD-10-CM

## 2024-10-18 DIAGNOSIS — R00.2 PALPITATIONS: ICD-10-CM

## 2024-10-18 DIAGNOSIS — I10 PRIMARY HYPERTENSION: Primary | ICD-10-CM

## 2024-10-18 PROCEDURE — 3075F SYST BP GE 130 - 139MM HG: CPT | Performed by: FAMILY MEDICINE

## 2024-10-18 PROCEDURE — 3079F DIAST BP 80-89 MM HG: CPT | Performed by: FAMILY MEDICINE

## 2024-10-18 PROCEDURE — 99214 OFFICE O/P EST MOD 30 MIN: CPT | Performed by: FAMILY MEDICINE

## 2024-10-18 ASSESSMENT — ENCOUNTER SYMPTOMS
COUGH: 0
WHEEZING: 0
SHORTNESS OF BREATH: 0
ABDOMINAL PAIN: 0
EYE ITCHING: 0
RHINORRHEA: 0
EYES NEGATIVE: 1
SORE THROAT: 0
EYE PAIN: 0
SINUS PRESSURE: 0

## 2024-10-18 NOTE — PROGRESS NOTES
Vascular: No carotid bruit.   Cardiovascular:      Rate and Rhythm: Normal rate and regular rhythm.      Pulses: Normal pulses.      Heart sounds: Normal heart sounds, S1 normal and S2 normal. No murmur heard.  Pulmonary:      Effort: Pulmonary effort is normal. No respiratory distress.      Breath sounds: Normal breath sounds. No wheezing, rhonchi or rales.   Abdominal:      General: Bowel sounds are normal. There is no distension.      Palpations: Abdomen is soft.      Tenderness: There is no abdominal tenderness. There is no right CVA tenderness, left CVA tenderness, guarding or rebound.   Musculoskeletal:         General: No swelling or tenderness. Normal range of motion.      Cervical back: Normal range of motion and neck supple. No rigidity or tenderness.      Right lower leg: No edema.      Left lower leg: No edema.   Lymphadenopathy:      Cervical: No cervical adenopathy.   Skin:     General: Skin is warm and dry.      Findings: No bruising or erythema.   Neurological:      General: No focal deficit present.      Mental Status: She is alert and oriented to person, place, and time.   Psychiatric:         Mood and Affect: Mood normal.         Behavior: Behavior normal.                  Assessment & Plan   ASSESSMENT/PLAN:  1. Primary hypertension-continue HCTZ for now  2. Palpitations-workup normal so far  3. Benign headache-resolving      Follow-up 1 month           An electronic signature was used to authenticate this note.    --Susan Walker MD     This note was generated completely or in part utilizing Dragon dictation speech recognition software.  Occasionally, words are mistranscribed and despite editing, the text may contain inaccuracies due to incorrect word recognition.  If further clarification is needed please contact the office at [phone]

## 2024-10-19 PROCEDURE — 99285 EMERGENCY DEPT VISIT HI MDM: CPT

## 2024-10-20 ENCOUNTER — HOSPITAL ENCOUNTER (EMERGENCY)
Age: 39
Discharge: HOME | End: 2024-10-20
Payer: COMMERCIAL

## 2024-10-20 ENCOUNTER — APPOINTMENT (OUTPATIENT)
Dept: GENERAL RADIOLOGY | Age: 39
End: 2024-10-20
Payer: COMMERCIAL

## 2024-10-20 VITALS
TEMPERATURE: 97.9 F | HEART RATE: 81 BPM | SYSTOLIC BLOOD PRESSURE: 135 MMHG | OXYGEN SATURATION: 99 % | RESPIRATION RATE: 17 BRPM | DIASTOLIC BLOOD PRESSURE: 90 MMHG

## 2024-10-20 DIAGNOSIS — R00.2 PALPITATIONS: Primary | ICD-10-CM

## 2024-10-20 DIAGNOSIS — Z87.59 S/P ECTOPIC PREGNANCY: ICD-10-CM

## 2024-10-20 LAB
ALBUMIN SERPL-MCNC: 5 G/DL (ref 3.4–5)
ALBUMIN/GLOB SERPL: 1.3 {RATIO} (ref 1.1–2.2)
ALP SERPL-CCNC: 53 U/L (ref 40–129)
ALT SERPL-CCNC: 18 U/L (ref 10–40)
ANION GAP SERPL CALCULATED.3IONS-SCNC: 12 MMOL/L (ref 3–16)
AST SERPL-CCNC: 21 U/L (ref 15–37)
BASOPHILS # BLD: 0.1 K/UL (ref 0–0.2)
BASOPHILS NFR BLD: 1.7 %
BILIRUB SERPL-MCNC: <0.2 MG/DL (ref 0–1)
BUN SERPL-MCNC: 13 MG/DL (ref 7–20)
CALCIUM SERPL-MCNC: 10.1 MG/DL (ref 8.3–10.6)
CHLORIDE SERPL-SCNC: 96 MMOL/L (ref 99–110)
CO2 SERPL-SCNC: 26 MMOL/L (ref 21–32)
CREAT SERPL-MCNC: 0.9 MG/DL (ref 0.6–1.1)
D-DIMER QUANTITATIVE: 0.28 UG/ML FEU (ref 0–0.6)
DEPRECATED RDW RBC AUTO: 15.1 % (ref 12.4–15.4)
EOSINOPHIL # BLD: 0.2 K/UL (ref 0–0.6)
EOSINOPHIL NFR BLD: 3.2 %
GFR SERPLBLD CREATININE-BSD FMLA CKD-EPI: 84 ML/MIN/{1.73_M2}
GLUCOSE SERPL-MCNC: 131 MG/DL (ref 70–99)
HCG SERPL QL: NEGATIVE
HCT VFR BLD AUTO: 38.6 % (ref 36–48)
HGB BLD-MCNC: 12.8 G/DL (ref 12–16)
LYMPHOCYTES # BLD: 1.6 K/UL (ref 1–5.1)
LYMPHOCYTES NFR BLD: 26 %
MCH RBC QN AUTO: 27.5 PG (ref 26–34)
MCHC RBC AUTO-ENTMCNC: 33.2 G/DL (ref 31–36)
MCV RBC AUTO: 83 FL (ref 80–100)
MONOCYTES # BLD: 0.4 K/UL (ref 0–1.3)
MONOCYTES NFR BLD: 7.1 %
NEUTROPHILS # BLD: 3.8 K/UL (ref 1.7–7.7)
NEUTROPHILS NFR BLD: 62 %
PLATELET # BLD AUTO: 275 K/UL (ref 135–450)
PMV BLD AUTO: 8.8 FL (ref 5–10.5)
POTASSIUM SERPL-SCNC: 3.5 MMOL/L (ref 3.5–5.1)
PROT SERPL-MCNC: 8.8 G/DL (ref 6.4–8.2)
RBC # BLD AUTO: 4.65 M/UL (ref 4–5.2)
SODIUM SERPL-SCNC: 134 MMOL/L (ref 136–145)
TROPONIN, HIGH SENSITIVITY: 10 NG/L (ref 0–14)
TROPONIN, HIGH SENSITIVITY: 9 NG/L (ref 0–14)
TSH SERPL DL<=0.005 MIU/L-ACNC: 1.63 UIU/ML (ref 0.27–4.2)
WBC # BLD AUTO: 6.1 K/UL (ref 4–11)

## 2024-10-20 PROCEDURE — 84484 ASSAY OF TROPONIN QUANT: CPT

## 2024-10-20 PROCEDURE — 84703 CHORIONIC GONADOTROPIN ASSAY: CPT

## 2024-10-20 PROCEDURE — 85379 FIBRIN DEGRADATION QUANT: CPT

## 2024-10-20 PROCEDURE — 84443 ASSAY THYROID STIM HORMONE: CPT

## 2024-10-20 PROCEDURE — 85025 COMPLETE CBC W/AUTO DIFF WBC: CPT

## 2024-10-20 PROCEDURE — 80053 COMPREHEN METABOLIC PANEL: CPT

## 2024-10-20 PROCEDURE — 71046 X-RAY EXAM CHEST 2 VIEWS: CPT

## 2024-10-20 ASSESSMENT — PAIN DESCRIPTION - LOCATION: LOCATION: HEAD

## 2024-10-20 ASSESSMENT — PAIN - FUNCTIONAL ASSESSMENT: PAIN_FUNCTIONAL_ASSESSMENT: 0-10

## 2024-10-20 ASSESSMENT — PAIN SCALES - GENERAL: PAINLEVEL_OUTOF10: 5

## 2024-10-20 NOTE — ED PROVIDER NOTES
Ectopic pregnancy.    CONSULTS: (Who and What was discussed)  None      Social Determinants Significantly Affecting Health : None    Records Reviewed (External and Source) previous family medicine office visit, previous emergency room visit    CC/HPI Summary, DDx, ED Course, and Reassessment: Briefly, this is a 39-year-old female who presents to the emergency department today for concerns of palpitations.  Patient reports that she has been seen in the ER several times for this, as well as high blood pressure.  She reports that she recently saw her primary care physician and was prescribed 25 of hydrochlorothiazide.  The patient reports that when she \"calm down\".  After taking the medication her blood pressure went down around 4 AM this morning.  She reports that she had a gradual onset of a headache which she states has been chronic for her and she did take 2 Fioricet.  The patient reports that she then checked her  blood pressure and she reports that it was elevated, she was worried about this and took a second dose of hydrochlorothiazide, 25 mg, around 9 PM.  The patient reports that she began to experience palpitations after this    The patient arrives to the ED she states that she is feeling much better.  She is not hypertensive or tachycardic when I evaluated her.  She does appear to be anxious although she is denying any increasing anxiety at home however she does mention several times that when she \"calms down\".  Her symptoms get better.  She has a heart score of 1, she is PERC negative with a low Wells score.    Disposition Considerations (tests considered but not done, Admit vs D/C, Shared Decision Making, Pt Expectation of Test or Tx.):    EKG as documented by my attending, please see his note for further details.    CBC shows no evidence of leukocytosis or anemia.  Her CMP is unremarkable.  Pregnancy is negative.  D-dimer negative.  Troponin negative x 2, and chest x-ray is negative.    Patient has

## 2024-10-20 NOTE — ED NOTES
Pt discharged in stable condition, AVS and follow up care reviewed, all questions answered, IV removed.

## 2024-10-21 ENCOUNTER — OFFICE VISIT (OUTPATIENT)
Dept: FAMILY MEDICINE CLINIC | Age: 39
End: 2024-10-21
Payer: COMMERCIAL

## 2024-10-21 ENCOUNTER — TELEPHONE (OUTPATIENT)
Dept: CARDIOLOGY CLINIC | Age: 39
End: 2024-10-21

## 2024-10-21 VITALS
DIASTOLIC BLOOD PRESSURE: 90 MMHG | HEIGHT: 64 IN | OXYGEN SATURATION: 98 % | BODY MASS INDEX: 30.22 KG/M2 | HEART RATE: 96 BPM | WEIGHT: 177 LBS | RESPIRATION RATE: 16 BRPM | TEMPERATURE: 97.7 F | SYSTOLIC BLOOD PRESSURE: 136 MMHG

## 2024-10-21 DIAGNOSIS — I10 PRIMARY HYPERTENSION: Primary | ICD-10-CM

## 2024-10-21 DIAGNOSIS — G43.009 MIGRAINE WITHOUT AURA AND WITHOUT STATUS MIGRAINOSUS, NOT INTRACTABLE: ICD-10-CM

## 2024-10-21 PROCEDURE — G2211 COMPLEX E/M VISIT ADD ON: HCPCS | Performed by: NURSE PRACTITIONER

## 2024-10-21 PROCEDURE — 3080F DIAST BP >= 90 MM HG: CPT | Performed by: NURSE PRACTITIONER

## 2024-10-21 PROCEDURE — 99214 OFFICE O/P EST MOD 30 MIN: CPT | Performed by: NURSE PRACTITIONER

## 2024-10-21 PROCEDURE — 3075F SYST BP GE 130 - 139MM HG: CPT | Performed by: NURSE PRACTITIONER

## 2024-10-21 RX ORDER — CARVEDILOL 12.5 MG/1
12.5 TABLET ORAL 2 TIMES DAILY
Qty: 60 TABLET | Refills: 0 | Status: SHIPPED | OUTPATIENT
Start: 2024-10-21 | End: 2024-11-20

## 2024-10-21 RX ORDER — PROMETHAZINE HYDROCHLORIDE 6.25 MG/5ML
12.5 SYRUP ORAL 4 TIMES DAILY PRN
Qty: 240 ML | Refills: 0 | Status: SHIPPED | OUTPATIENT
Start: 2024-10-21 | End: 2024-10-31

## 2024-10-21 ASSESSMENT — ENCOUNTER SYMPTOMS
COUGH: 0
CHEST TIGHTNESS: 0
ABDOMINAL PAIN: 0
WHEEZING: 0
SHORTNESS OF BREATH: 0

## 2024-10-21 NOTE — PROGRESS NOTES
Melissa Montilla (:  1985) is a 39 y.o. female,Established patient, here for evaluation of the following chief complaint(s):  Follow-up (Discuss Blood pressure medication)      ASSESSMENT/PLAN:  1. Primary hypertension  Assessment & Plan:    New, not at goal (unstable), start low dose betablocker twice daily to help control bp and hopefully help with headache. Follow up on friday  Orders:  -     carvedilol (COREG) 12.5 MG tablet; Take 1 tablet by mouth 2 times daily, Disp-60 tablet, R-0Normal  2. Migraine without aura and without status migrainosus, not intractable  Assessment & Plan:   Chronic, not at goal (unstable), discussed using imitrex for headache prn, avoid fioricet, can also use phenergan to help with headache and sleep with close follow up on friday  Orders:  -     promethazine (PHENERGAN) 6.25 MG/5ML syrup; Take 10 mLs by mouth 4 times daily as needed for Nausea, Disp-240 mL, R-0Normal      There are no Patient Instructions on file for this visit.     Return in about 4 days (around 10/25/2024).    SUBJECTIVE/OBJECTIVE:  HPI  Seen for follow up after ed visit  Not feeling well on hctz, has not been getting any sleep over the last 2 weeks, still having headache.   Family with patient reports concern over using fioricet too often causing inability to sleep        Current Outpatient Medications   Medication Sig Dispense Refill    carvedilol (COREG) 12.5 MG tablet Take 1 tablet by mouth 2 times daily 60 tablet 0    promethazine (PHENERGAN) 6.25 MG/5ML syrup Take 10 mLs by mouth 4 times daily as needed for Nausea 240 mL 0    SUMAtriptan (IMITREX) 50 MG tablet Take 1 tablet at first sign of headache, if unrelieved after 1 hr may take additional tablet. Do not take more than 2 tablets in 24hrs 10 tablet 1    diclofenac sodium (VOLTAREN) 1 % GEL Apply topically 2 times daily      fluticasone (FLONASE) 50 MCG/ACT nasal spray SHAKE LIQUID AND USE 2 SPRAYS IN EACH NOSTRIL DAILY 16 g 0     No

## 2024-10-21 NOTE — ASSESSMENT & PLAN NOTE
Chronic, not at goal (unstable), discussed using imitrex for headache prn, avoid fioricet, can also use phenergan to help with headache and sleep with close follow up on friday

## 2024-10-21 NOTE — TELEPHONE ENCOUNTER
New Patient Referral    Referring Provider Name:  [facility]   Phone Number:  Fax Number:  Address: [address]    Diagnosis/Reason for Visit: palpitations    Cardiac Clearance? NO    Cardiac Testing: (Yes/No/Unsure)     Date testing was completed?___________      Have records been requested? (Yes/No)    Preferred Language:  English    RAE for pt to call and schedule w/1st avail provider

## 2024-10-21 NOTE — ASSESSMENT & PLAN NOTE
New, not at goal (unstable), start low dose betablocker twice daily to help control bp and hopefully help with headache. Follow up on friday

## 2024-10-22 NOTE — TELEPHONE ENCOUNTER
Patient was referred by Rudy Valdivia  to the [facility] location with Dr. STEPHENSON.  Patient has been scheduled for Friday 10/25 at 7:45 am .  Patient verbalized understanding of appointment instructions.  Call complete.

## 2024-10-24 ENCOUNTER — OFFICE VISIT (OUTPATIENT)
Dept: GYNECOLOGY | Age: 39
End: 2024-10-24
Payer: COMMERCIAL

## 2024-10-24 VITALS
OXYGEN SATURATION: 99 % | BODY MASS INDEX: 30.71 KG/M2 | HEIGHT: 64 IN | DIASTOLIC BLOOD PRESSURE: 82 MMHG | SYSTOLIC BLOOD PRESSURE: 140 MMHG | WEIGHT: 179.9 LBS | HEART RATE: 94 BPM

## 2024-10-24 DIAGNOSIS — Z01.419 WELL WOMAN EXAM WITH ROUTINE GYNECOLOGICAL EXAM: Primary | ICD-10-CM

## 2024-10-24 DIAGNOSIS — K42.9 UMBILICAL HERNIA WITHOUT OBSTRUCTION AND WITHOUT GANGRENE: ICD-10-CM

## 2024-10-24 DIAGNOSIS — N83.202 LEFT OVARIAN CYST: ICD-10-CM

## 2024-10-24 PROCEDURE — 99385 PREV VISIT NEW AGE 18-39: CPT | Performed by: OBSTETRICS & GYNECOLOGY

## 2024-10-24 PROCEDURE — 3079F DIAST BP 80-89 MM HG: CPT | Performed by: OBSTETRICS & GYNECOLOGY

## 2024-10-24 PROCEDURE — 3077F SYST BP >= 140 MM HG: CPT | Performed by: OBSTETRICS & GYNECOLOGY

## 2024-10-24 NOTE — ASSESSMENT & PLAN NOTE
Intermittent palpitations over the past 6 months  48 H Holter monitor July 2024 unremarkable  Patient does not feel that her symptoms were adequately represented on the short term monitor  Offered extended 30 d monitor to patient  Check echo  Labs including TSH, CBC unremarkable, CMP with normal renal function and potassium

## 2024-10-24 NOTE — PROGRESS NOTES
GENERAL CARDIOLOGY    REFERRING PROVIDER  Rudy Valdivia APRN - CNP     CHIEF COMPLAINT  Palpitations          HPI    Melissa Montilla is a 39 y.o. female presents to the clinic for referral for palpitations.     Most recent,10/20/24, presented to ED with elevated BP and HR, she took extra dose of HCTZ, palpitations began.     Medical history reviewed, significant for hypertension.    Today, the patient reports:  Onset of palpitations within the past 6 months  Intermittent, not frequent until recently when she presented to ED after not feeling well on newly introduced HCTZ  On day prior to ED visit 10/20/2024, she had two episodes of rapid heart beat, used PulseOx at home, 140-150 bpm  Reports headaches associated with high blood pressure, being treated with migraine medication  Previously tried IVF about 2 years ago but no recent hormonal therapy  Uses tylenol OTC, no other OTC medications  Snores at night, tired during day, nocturnal urination, difficulty staying asleep  No personal history of syncope, no exertional chest pain or MARK  FH of HTN in Mom    ASSESSMENT AND PLAN  Palpitations  Intermittent palpitations over the past 6 months  48 H Holter monitor July 2024 unremarkable  Patient does not feel that her symptoms were adequately represented on the short term monitor  Offered extended 30 d monitor to patient  Check echo  Labs including TSH, CBC unremarkable, CMP with normal renal function and potassium    Primary hypertension  BP today is 116/92, diastolic HTN  Started on coreg 3 days ago  Did not tolerate HCTZ, recommend trial of spironolactone  TSH, renal function normal  Sleep study, Echo recommended  Check UA  Consider renal artery duplex  Weight loss, reduce sodium intake discussed  Consider genetic component      ROLDAN (obstructive sleep apnea)  Suspected sleep apnea  Sleep medicine referral      Return in about 6 weeks (around 12/6/2024).    Orders Placed This Encounter    Urinalysis    [facility]

## 2024-10-24 NOTE — ASSESSMENT & PLAN NOTE
BP today is 116/92, diastolic HTN  Started on coreg 3 days ago  Did not tolerate HCTZ, recommend trial of spironolactone  TSH, renal function normal  Sleep study recommended  Check UA  Consider renal artery duplex  Weight loss, reduce sodium intake discussed  Consider genetic component

## 2024-10-25 ENCOUNTER — OFFICE VISIT (OUTPATIENT)
Dept: CARDIOLOGY CLINIC | Age: 39
End: 2024-10-25
Payer: COMMERCIAL

## 2024-10-25 VITALS
SYSTOLIC BLOOD PRESSURE: 116 MMHG | BODY MASS INDEX: 30.73 KG/M2 | DIASTOLIC BLOOD PRESSURE: 92 MMHG | HEIGHT: 64 IN | WEIGHT: 180 LBS | OXYGEN SATURATION: 99 % | HEART RATE: 82 BPM

## 2024-10-25 DIAGNOSIS — G47.33 OSA (OBSTRUCTIVE SLEEP APNEA): ICD-10-CM

## 2024-10-25 DIAGNOSIS — I10 PRIMARY HYPERTENSION: ICD-10-CM

## 2024-10-25 DIAGNOSIS — R00.2 PALPITATIONS: Primary | ICD-10-CM

## 2024-10-25 PROCEDURE — 93000 ELECTROCARDIOGRAM COMPLETE: CPT | Performed by: INTERNAL MEDICINE

## 2024-10-25 PROCEDURE — 99204 OFFICE O/P NEW MOD 45 MIN: CPT | Performed by: INTERNAL MEDICINE

## 2024-10-25 PROCEDURE — 3074F SYST BP LT 130 MM HG: CPT | Performed by: INTERNAL MEDICINE

## 2024-10-25 PROCEDURE — 3080F DIAST BP >= 90 MM HG: CPT | Performed by: INTERNAL MEDICINE

## 2024-10-25 RX ORDER — SPIRONOLACTONE 25 MG/1
25 TABLET ORAL DAILY
Qty: 90 TABLET | Refills: 1 | Status: SHIPPED | OUTPATIENT
Start: 2024-10-25

## 2024-10-25 NOTE — PATIENT INSTRUCTIONS
Echocardiogram (Ultrasound of heart)    Cardiac Monitor , You will wear for 30 days    Sleep medicine referral    You will continue the Coreg    You will start Spironolactone 25mg daily, take in the morning    Monitor your BP daily    Urinalysis    Follow up in 6 weeks        
No

## 2024-10-26 ENCOUNTER — HOSPITAL ENCOUNTER (EMERGENCY)
Age: 39
Discharge: HOME | End: 2024-10-26
Attending: EMERGENCY MEDICINE
Payer: COMMERCIAL

## 2024-10-26 VITALS
WEIGHT: 180 LBS | SYSTOLIC BLOOD PRESSURE: 129 MMHG | HEIGHT: 64 IN | RESPIRATION RATE: 16 BRPM | TEMPERATURE: 98 F | BODY MASS INDEX: 30.73 KG/M2 | HEART RATE: 80 BPM | DIASTOLIC BLOOD PRESSURE: 76 MMHG | OXYGEN SATURATION: 100 %

## 2024-10-26 DIAGNOSIS — R51.9 NONINTRACTABLE HEADACHE, UNSPECIFIED CHRONICITY PATTERN, UNSPECIFIED HEADACHE TYPE: Primary | ICD-10-CM

## 2024-10-26 PROCEDURE — 99282 EMERGENCY DEPT VISIT SF MDM: CPT

## 2024-10-26 ASSESSMENT — PAIN DESCRIPTION - DESCRIPTORS: DESCRIPTORS: HEAVINESS

## 2024-10-26 ASSESSMENT — ENCOUNTER SYMPTOMS
VOMITING: 0
ALLERGIC/IMMUNOLOGIC NEGATIVE: 1
RESPIRATORY NEGATIVE: 1
EYES NEGATIVE: 1
NAUSEA: 0
CHEST TIGHTNESS: 0
ABDOMINAL PAIN: 0
GASTROINTESTINAL NEGATIVE: 1
DIARRHEA: 0
SHORTNESS OF BREATH: 0

## 2024-10-26 ASSESSMENT — PAIN DESCRIPTION - LOCATION: LOCATION: HEAD

## 2024-10-26 ASSESSMENT — PAIN - FUNCTIONAL ASSESSMENT: PAIN_FUNCTIONAL_ASSESSMENT: 0-10

## 2024-10-26 ASSESSMENT — PAIN SCALES - GENERAL: PAINLEVEL_OUTOF10: 9

## 2024-10-26 NOTE — PROGRESS NOTES
Subjective   Patient ID: Melissa Montilla is a 39 y.o. female.    Patient is here for annual. Patient found to have left ovarian cyst. Patient states has both tubes removed. Had some pelvic pain.        Review of Systems   Constitutional: Negative.    HENT: Negative.     Eyes: Negative.    Respiratory: Negative.     Cardiovascular: Negative.    Gastrointestinal: Negative.    Endocrine: Negative.    Genitourinary: Negative.    Musculoskeletal: Negative.    Skin: Negative.    Allergic/Immunologic: Negative.    Neurological: Negative.    Hematological: Negative.    Psychiatric/Behavioral: Negative.       Date of Birth 1985  Past Medical History:   Diagnosis Date    Allergic rhinitis     Ectopic pregnancy      Past Surgical History:   Procedure Laterality Date    ECTOPIC PREGNANCY SURGERY  2012    tubal; treated surgically in [facility].     LAPAROSCOPY Left     left fallopian tube removed    LAPAROTOMY  1987    remove swallowed object    SALPINGECTOMY Right 2012    ectopic     OB History    Para Term  AB Living   1 0 0 0 1 0   SAB IAB Ectopic Molar Multiple Live Births   0 0 1 0 0 0      # Outcome Date GA Lbr Darren/2nd Weight Sex Type Anes PTL Lv   1 Ectopic              Social History     Socioeconomic History    Marital status:      Spouse name: Haylie    Number of children: 0    Years of education: Not on file    Highest education level: Not on file   Occupational History    Occupation: packaging: [employer]   Tobacco Use    Smoking status: Never    Smokeless tobacco: Never   Vaping Use    Vaping status: Never Used   Substance and Sexual Activity    Alcohol use: Never     Comment: occasionally    Drug use: Never    Sexual activity: Not Currently     Partners: Female, Male     Comment:     Other Topics Concern    Not on file   Social History Narrative    ** Merged History Encounter **          Social Determinants of Health     Financial Resource

## 2024-10-27 NOTE — ED PROVIDER NOTES
[facility] EMERGENCY DEPARTMENT  EMERGENCY DEPARTMENTENCOUNTER      Pt Name: Melissa Montilla  MRN: [id number]  Birthdate 1985  Date ofevaluation: 10/26/2024  Provider: Yahir Jay MD    CHIEF COMPLAINT       Chief Complaint   Patient presents with    Headache     HA and HTN. Patient states she has heaviness in her head and high blood pressure x 3 weeks and states she can't get out of bed when she feels like this. States she went to urgent care today and they told her to come to the ED for high blood pressure.        HPI    HISTORY OF PRESENT ILLNESS   (Location/Symptom, Timing/Onset,Context/Setting, Quality, Duration, Modifying Factors, Severity)  Note limiting factors.   Melissa Montilla is a 39 y.o. female who presents to the emergency department with a headache.  This is a 31-year-old female with a history of headaches and hypertension who apparently went to an urgent care center earlier today because of her headaches.  The urgent care apparently checked her blood pressure and told to go to the ER for further care.  The patient states that this is not new.  She has had headaches now for months.  She has had multiple CTs of the head recently as well.  She apparently is been put on Fioricet in the past for headaches but does not like to take it because it might be addictive.  There is nothing new with her headaches.  She is also on Imitrex as well.  She denies any shortness of breath or chest pain.        NursingNotes were reviewed.    Review of Systems    REVIEW OF SYSTEMS    (2-9 systems for level 4, 10 or more for level 5)     Review of Systems   Constitutional: Negative for fever.   HENT: Negative for rhinorrhea and sore throat.    Eyes: Negative for redness.   Respiratory: Negative for shortness of breath.    Cardiovascular: Negative for chest pain.   Gastrointestinal: Negative for abdominal pain.   Genitourinary: Negative for flank pain.   Neurological: Negative for

## 2024-10-27 NOTE — ED PROVIDER NOTES
[facility] EMERGENCY DEPARTMENT  EMERGENCY DEPARTMENT ENCOUNTER        Pt Name: Melissa oMntilla  MRN: [id number]  Birthdate 1985  Date of evaluation: 10/26/2024  Provider: MESFIN Shah CNP  PCP: Rudy Valdivia APRN - CNP  Note Started: 8:10 PM EDT 10/26/24       I have seen and evaluated this patient with my supervising physician Yahir Jay MD.      CHIEF COMPLAINT       Chief Complaint   Patient presents with    Headache     HA and HTN. Patient states she has heaviness in her head and high blood pressure x 3 weeks and states she can't get out of bed when she feels like this. States she went to urgent care today and they told her to come to the ED for high blood pressure.        HISTORY OF PRESENT ILLNESS: 1 or more Elements     History from : Patient and Family     Limitations to history : None    Melissa Montilla is a 39 y.o. female who presents to the emergency department after being sent over from urgent care for high blood pressure.  Patient reports that she has had a pressure-like sensation to the right side of her head with associated high blood pressure over the past 3 weeks which waxes and wanes, has not been constant.  She has been seen by primary care, urgent care, as well as the emergency department.  The patient was initially taken Fioricet but has stopped this medication and was placed on Imitrex, she has been taking Imitrex twice daily.    She is asking for her blood pressure cuff to be checked against our cuff.  She was seen by cardiology earlier this week and was placed on spironolactone in addition to the Coreg that she is currently taking, the HCTZ that she was taking was discontinued.    Reports that she has had recent CT imaging of the brain which was unremarkable.     states that she has been \"unable to get out of bed\"    Denies any fever, lightheadedness, dizziness, visual disturbances.  No chest pain or pressure.  No

## 2024-11-01 ENCOUNTER — OFFICE VISIT (OUTPATIENT)
Dept: FAMILY MEDICINE CLINIC | Age: 39
End: 2024-11-01
Payer: COMMERCIAL

## 2024-11-01 VITALS
HEART RATE: 83 BPM | DIASTOLIC BLOOD PRESSURE: 82 MMHG | WEIGHT: 179 LBS | TEMPERATURE: 97.8 F | BODY MASS INDEX: 30.56 KG/M2 | OXYGEN SATURATION: 98 % | HEIGHT: 64 IN | SYSTOLIC BLOOD PRESSURE: 122 MMHG

## 2024-11-01 DIAGNOSIS — I10 PRIMARY HYPERTENSION: Primary | ICD-10-CM

## 2024-11-01 DIAGNOSIS — R00.2 PALPITATIONS: ICD-10-CM

## 2024-11-01 PROCEDURE — 99214 OFFICE O/P EST MOD 30 MIN: CPT | Performed by: FAMILY MEDICINE

## 2024-11-01 PROCEDURE — 3079F DIAST BP 80-89 MM HG: CPT | Performed by: FAMILY MEDICINE

## 2024-11-01 PROCEDURE — 3074F SYST BP LT 130 MM HG: CPT | Performed by: FAMILY MEDICINE

## 2024-11-01 ASSESSMENT — ENCOUNTER SYMPTOMS
SHORTNESS OF BREATH: 0
WHEEZING: 0
EYE ITCHING: 0
SORE THROAT: 0
ABDOMINAL PAIN: 0
EYES NEGATIVE: 1
SINUS PRESSURE: 0
RHINORRHEA: 0
COUGH: 0
EYE PAIN: 0

## 2024-11-01 NOTE — PROGRESS NOTES
discussed    Follow-up 1 week           An electronic signature was used to authenticate this note.    --Suasn Walker MD     This note was generated completely or in part utilizing Dragon dictation speech recognition software.  Occasionally, words are mistranscribed and despite editing, the text may contain inaccuracies due to incorrect word recognition.  If further clarification is needed please contact the office at [phone]

## 2024-11-14 ENCOUNTER — OFFICE VISIT (OUTPATIENT)
Dept: FAMILY MEDICINE CLINIC | Age: 39
End: 2024-11-14

## 2024-11-14 ENCOUNTER — HOSPITAL ENCOUNTER (EMERGENCY)
Age: 39
Discharge: HOME | End: 2024-11-14
Attending: EMERGENCY MEDICINE
Payer: COMMERCIAL

## 2024-11-14 VITALS
TEMPERATURE: 98.7 F | OXYGEN SATURATION: 98 % | SYSTOLIC BLOOD PRESSURE: 130 MMHG | HEART RATE: 76 BPM | HEIGHT: 64 IN | BODY MASS INDEX: 31.07 KG/M2 | DIASTOLIC BLOOD PRESSURE: 82 MMHG | WEIGHT: 182 LBS

## 2024-11-14 VITALS
RESPIRATION RATE: 20 BRPM | SYSTOLIC BLOOD PRESSURE: 114 MMHG | DIASTOLIC BLOOD PRESSURE: 82 MMHG | OXYGEN SATURATION: 99 % | BODY MASS INDEX: 31.07 KG/M2 | HEIGHT: 64 IN | TEMPERATURE: 98.1 F | WEIGHT: 182 LBS | HEART RATE: 89 BPM

## 2024-11-14 DIAGNOSIS — I10 PRIMARY HYPERTENSION: ICD-10-CM

## 2024-11-14 DIAGNOSIS — T78.2XXA ACUTE ANAPHYLAXIS, INITIAL ENCOUNTER: Primary | ICD-10-CM

## 2024-11-14 DIAGNOSIS — R42 DIZZINESS: Primary | ICD-10-CM

## 2024-11-14 DIAGNOSIS — G43.009 MIGRAINE WITHOUT AURA AND WITHOUT STATUS MIGRAINOSUS, NOT INTRACTABLE: Primary | ICD-10-CM

## 2024-11-14 DIAGNOSIS — G43.009 MIGRAINE WITHOUT AURA AND WITHOUT STATUS MIGRAINOSUS, NOT INTRACTABLE: ICD-10-CM

## 2024-11-14 LAB
ANION GAP SERPL CALCULATED.3IONS-SCNC: 12 MMOL/L (ref 3–16)
BASOPHILS # BLD: 0 K/UL (ref 0–0.2)
BASOPHILS NFR BLD: 0.6 %
BUN SERPL-MCNC: 11 MG/DL (ref 7–20)
CALCIUM SERPL-MCNC: 8.9 MG/DL (ref 8.3–10.6)
CHLORIDE SERPL-SCNC: 103 MMOL/L (ref 99–110)
CO2 SERPL-SCNC: 21 MMOL/L (ref 21–32)
CREAT SERPL-MCNC: 0.8 MG/DL (ref 0.6–1.1)
DEPRECATED RDW RBC AUTO: 15.6 % (ref 12.4–15.4)
EOSINOPHIL # BLD: 0.1 K/UL (ref 0–0.6)
EOSINOPHIL NFR BLD: 1.2 %
GFR SERPLBLD CREATININE-BSD FMLA CKD-EPI: >90 ML/MIN/{1.73_M2}
GLUCOSE SERPL-MCNC: 193 MG/DL (ref 70–99)
HCT VFR BLD AUTO: 42 % (ref 36–48)
HGB BLD-MCNC: 13.9 G/DL (ref 12–16)
LYMPHOCYTES # BLD: 2.7 K/UL (ref 1–5.1)
LYMPHOCYTES NFR BLD: 53.5 %
MCH RBC QN AUTO: 27.9 PG (ref 26–34)
MCHC RBC AUTO-ENTMCNC: 33.2 G/DL (ref 31–36)
MCV RBC AUTO: 84.2 FL (ref 80–100)
MONOCYTES # BLD: 0.2 K/UL (ref 0–1.3)
MONOCYTES NFR BLD: 4.4 %
NEUTROPHILS # BLD: 2 K/UL (ref 1.7–7.7)
NEUTROPHILS NFR BLD: 40.3 %
PLATELET # BLD AUTO: 276 K/UL (ref 135–450)
PMV BLD AUTO: 8.3 FL (ref 5–10.5)
POTASSIUM SERPL-SCNC: 4.2 MMOL/L (ref 3.5–5.1)
RBC # BLD AUTO: 4.98 M/UL (ref 4–5.2)
REASON FOR REJECTION: NORMAL
REJECTED TEST: NORMAL
SODIUM SERPL-SCNC: 136 MMOL/L (ref 136–145)
WBC # BLD AUTO: 5 K/UL (ref 4–11)

## 2024-11-14 PROCEDURE — 6360000002 HC RX W HCPCS: Performed by: EMERGENCY MEDICINE

## 2024-11-14 PROCEDURE — 6360000002 HC RX W HCPCS

## 2024-11-14 PROCEDURE — 85025 COMPLETE CBC W/AUTO DIFF WBC: CPT

## 2024-11-14 PROCEDURE — 2500000003 HC RX 250 WO HCPCS

## 2024-11-14 PROCEDURE — 99284 EMERGENCY DEPT VISIT MOD MDM: CPT

## 2024-11-14 PROCEDURE — 80048 BASIC METABOLIC PNL TOTAL CA: CPT

## 2024-11-14 PROCEDURE — 96372 THER/PROPH/DIAG INJ SC/IM: CPT

## 2024-11-14 PROCEDURE — 36415 COLL VENOUS BLD VENIPUNCTURE: CPT

## 2024-11-14 RX ORDER — EPINEPHRINE 1 MG/ML
INJECTION, SOLUTION INTRAMUSCULAR; SUBCUTANEOUS
Status: DISCONTINUED
Start: 2024-11-14 | End: 2024-11-14 | Stop reason: HOSPADM

## 2024-11-14 RX ORDER — PREDNISONE 20 MG/1
40 TABLET ORAL DAILY
Qty: 8 TABLET | Refills: 0 | Status: SHIPPED | OUTPATIENT
Start: 2024-11-14 | End: 2024-11-18

## 2024-11-14 RX ORDER — EPINEPHRINE 0.3 MG/.3ML
0.3 INJECTION SUBCUTANEOUS ONCE
Qty: 1 EACH | Refills: 0 | Status: SHIPPED | OUTPATIENT
Start: 2024-11-14 | End: 2024-11-14

## 2024-11-14 RX ORDER — FAMOTIDINE 10 MG/ML
20 INJECTION, SOLUTION INTRAVENOUS ONCE
Status: DISCONTINUED | OUTPATIENT
Start: 2024-11-14 | End: 2024-11-14 | Stop reason: HOSPADM

## 2024-11-14 RX ORDER — FAMOTIDINE 10 MG/ML
INJECTION, SOLUTION INTRAVENOUS
Status: COMPLETED
Start: 2024-11-14 | End: 2024-11-14

## 2024-11-14 RX ORDER — EPINEPHRINE 1 MG/ML
0.3 INJECTION, SOLUTION INTRAMUSCULAR; SUBCUTANEOUS ONCE
Status: COMPLETED | OUTPATIENT
Start: 2024-11-14 | End: 2024-11-14

## 2024-11-14 RX ORDER — DIPHENHYDRAMINE HYDROCHLORIDE 50 MG/ML
50 INJECTION INTRAMUSCULAR; INTRAVENOUS ONCE
Status: DISCONTINUED | OUTPATIENT
Start: 2024-11-14 | End: 2024-11-14 | Stop reason: HOSPADM

## 2024-11-14 RX ORDER — ACETAMINOPHEN AND CODEINE PHOSPHATE 300; 30 MG/1; MG/1
1 TABLET ORAL EVERY 8 HOURS PRN
Qty: 21 TABLET | Refills: 0 | Status: SHIPPED | OUTPATIENT
Start: 2024-11-14 | End: 2024-11-14

## 2024-11-14 RX ORDER — DIPHENHYDRAMINE HCL 25 MG
25 CAPSULE ORAL EVERY 6 HOURS PRN
Qty: 20 CAPSULE | Refills: 0 | Status: SHIPPED | OUTPATIENT
Start: 2024-11-14 | End: 2024-11-24

## 2024-11-14 RX ORDER — CARVEDILOL 12.5 MG/1
6.25 TABLET ORAL 2 TIMES DAILY
Qty: 30 TABLET | Refills: 0
Start: 2024-11-14 | End: 2024-12-14

## 2024-11-14 RX ORDER — DIPHENHYDRAMINE HYDROCHLORIDE 50 MG/ML
INJECTION INTRAMUSCULAR; INTRAVENOUS
Status: COMPLETED
Start: 2024-11-14 | End: 2024-11-14

## 2024-11-14 RX ORDER — SPIRONOLACTONE 25 MG/1
12.5 TABLET ORAL DAILY
Qty: 90 TABLET | Refills: 1
Start: 2024-11-14

## 2024-11-14 RX ADMIN — FAMOTIDINE 20 MG: 10 INJECTION, SOLUTION INTRAVENOUS at 17:46

## 2024-11-14 RX ADMIN — DIPHENHYDRAMINE HYDROCHLORIDE 50 MG: 50 INJECTION INTRAMUSCULAR; INTRAVENOUS at 17:46

## 2024-11-14 RX ADMIN — EPINEPHRINE 0.3 MG: 1 INJECTION INTRAMUSCULAR; INTRAVENOUS; SUBCUTANEOUS at 17:47

## 2024-11-14 ASSESSMENT — ENCOUNTER SYMPTOMS
EYES NEGATIVE: 1
COUGH: 0
RHINORRHEA: 0
ABDOMINAL PAIN: 0
EYE ITCHING: 0
EYE PAIN: 0
WHEEZING: 0
SINUS PRESSURE: 0
SHORTNESS OF BREATH: 0
SORE THROAT: 0

## 2024-11-14 ASSESSMENT — PAIN - FUNCTIONAL ASSESSMENT: PAIN_FUNCTIONAL_ASSESSMENT: NONE - DENIES PAIN

## 2024-11-14 NOTE — PROGRESS NOTES
Melissa Montilla (:  1985) is a 39 y.o. female,Established patient, here for evaluation of the following chief complaint(s):  Medication Check      ASSESSMENT/PLAN:  1. Migraine without aura and without status migrainosus, not intractable  -     MRI BRAIN W WO CONTRAST; Future  -     acetaminophen-codeine (TYLENOL #3) 300-30 MG per tablet; Take 1 tablet by mouth every 8 hours as needed for Pain for up to 7 days. Max Daily Amount: 3 tablets, Disp-21 tablet, R-0Normal  2. Primary hypertension  -     spironolactone (ALDACTONE) 25 MG tablet; Take 0.5 tablets by mouth daily, Disp-90 tablet, R-1NO PRINT  -     carvedilol (COREG) 12.5 MG tablet; Take 0.5 tablets by mouth 2 times daily, Disp-30 tablet, R-0NO PRINT    Decrease carvedilol to 6.25 bid, spironolactone 12.5 daily, obtain mri, schedule with neurology, schedule sleep study  Replace Fioricet with tylenol 3    There are no Patient Instructions on file for this visit.     Return in about 2 weeks (around 2024).    SUBJECTIVE/OBJECTIVE:  HPI  Pt reports complete relief of symptoms last week  Bp started to run low 100/60, pt stopped taking her medications and headache returned  Headache resolved with fioricet, but did not want to take fioricet with bp medications  Has restarted bp medication yesterday by taking 1/2 tab carvedilol   Bp controlled in office, headache mildly present        Current Outpatient Medications   Medication Sig Dispense Refill    spironolactone (ALDACTONE) 25 MG tablet Take 0.5 tablets by mouth daily 90 tablet 1    carvedilol (COREG) 12.5 MG tablet Take 0.5 tablets by mouth 2 times daily 30 tablet 0    acetaminophen-codeine (TYLENOL #3) 300-30 MG per tablet Take 1 tablet by mouth every 8 hours as needed for Pain for up to 7 days. Max Daily Amount: 3 tablets 21 tablet 0     No current facility-administered medications for this visit.       Past Medical History:  No date: Allergic rhinitis  No date: Ectopic pregnancy  Past

## 2024-11-15 NOTE — ED PROVIDER NOTES
EMERGENCY DEPARTMENT PROVIDER NOTE    Patient Identification  Pt Name: Melissa Montilla  MRN: [id number]  Birthdate 1985  Date of evaluation: 11/14/2024  Provider: Brooks Arrington DO  PCP: Rudy Valdivia APRN - CLAY    Chief Complaint  Allergic Reaction (Around 1700 took a Tylenol #3 and started breaking out in hives approx 30 min after. )      HPI  (History provided by patient and spouse/SO)  This is a 39 y.o. female with pertinent past medical history of hypertension, migraine headaches who was brought in by family for suspected allergic reaction.  Patient reports generalized body itching, welts and shortness of breath.  Spouse also reports that the patient's face seems \"swollen.\"  Patient denies any history of similar symptoms, she has no known allergies.  Symptoms began shortly after taking Tylenol #3 for the first time today, patient states that she has taken acetaminophen in the past with no difficulty however has never had codeine before.  No other new medications or known exposures recently.      I have reviewed the following nursing documentation:  Allergies: Codeine    Past medical history:   Past Medical History:   Diagnosis Date    Allergic rhinitis     Ectopic pregnancy      Past surgical history:   Past Surgical History:   Procedure Laterality Date    ECTOPIC PREGNANCY SURGERY  01/01/2012    tubal; treated surgically in [facility].     LAPAROSCOPY Left     left fallopian tube removed    LAPAROTOMY  01/01/1987    remove swallowed object    SALPINGECTOMY Right 01/01/2012    ectopic       Home medications:   Discharge Medication List as of 11/14/2024  8:32 PM        CONTINUE these medications which have NOT CHANGED    Details   spironolactone (ALDACTONE) 25 MG tablet Take 0.5 tablets by mouth daily, Disp-90 tablet, R-1NO PRINT      carvedilol (COREG) 12.5 MG tablet Take 0.5 tablets by mouth 2 times daily, Disp-30 tablet, R-0NO PRINT             Social history:  reports that she has never smoked.

## 2024-11-18 ENCOUNTER — OFFICE VISIT (OUTPATIENT)
Dept: FAMILY MEDICINE CLINIC | Age: 39
End: 2024-11-18
Payer: COMMERCIAL

## 2024-11-18 VITALS
TEMPERATURE: 97.9 F | HEIGHT: 64 IN | SYSTOLIC BLOOD PRESSURE: 122 MMHG | DIASTOLIC BLOOD PRESSURE: 82 MMHG | HEART RATE: 81 BPM | BODY MASS INDEX: 30.2 KG/M2 | RESPIRATION RATE: 16 BRPM | OXYGEN SATURATION: 100 % | WEIGHT: 176.9 LBS

## 2024-11-18 DIAGNOSIS — T78.40XD ALLERGIC REACTION, SUBSEQUENT ENCOUNTER: ICD-10-CM

## 2024-11-18 DIAGNOSIS — R20.2 TINGLING: Primary | ICD-10-CM

## 2024-11-18 DIAGNOSIS — G43.009 MIGRAINE WITHOUT AURA AND WITHOUT STATUS MIGRAINOSUS, NOT INTRACTABLE: ICD-10-CM

## 2024-11-18 DIAGNOSIS — I10 PRIMARY HYPERTENSION: ICD-10-CM

## 2024-11-18 LAB — HBA1C MFR BLD: 4.9 %

## 2024-11-18 PROCEDURE — 99214 OFFICE O/P EST MOD 30 MIN: CPT | Performed by: FAMILY MEDICINE

## 2024-11-18 PROCEDURE — 83036 HEMOGLOBIN GLYCOSYLATED A1C: CPT | Performed by: FAMILY MEDICINE

## 2024-11-18 PROCEDURE — 3079F DIAST BP 80-89 MM HG: CPT | Performed by: FAMILY MEDICINE

## 2024-11-18 PROCEDURE — 3074F SYST BP LT 130 MM HG: CPT | Performed by: FAMILY MEDICINE

## 2024-11-18 RX ORDER — TOPIRAMATE 25 MG/1
25 TABLET, FILM COATED ORAL NIGHTLY
Qty: 30 TABLET | Refills: 0 | Status: ON HOLD | OUTPATIENT
Start: 2024-11-18

## 2024-11-18 NOTE — TELEPHONE ENCOUNTER
Medication:   Requested Prescriptions     Pending Prescriptions Disp Refills    carvedilol (COREG) 12.5 MG tablet [Pharmacy Med Name: CARVEDILOL 12.5MG TABLETS] 60 tablet      Sig: TAKE 1 TABLET BY MOUTH TWICE DAILY        Last Filled:  carvedilol 11/14/2024    Patient Phone Number: [phone] (home)     Last appt: 11/14/2024   Next appt: Visit date not found    Last OARRS:        No data to display

## 2024-11-19 ENCOUNTER — HOSPITAL ENCOUNTER (EMERGENCY)
Age: 39
Discharge: HOME | End: 2024-11-19
Attending: STUDENT IN AN ORGANIZED HEALTH CARE EDUCATION/TRAINING PROGRAM
Payer: COMMERCIAL

## 2024-11-19 ENCOUNTER — APPOINTMENT (OUTPATIENT)
Dept: GENERAL RADIOLOGY | Age: 39
End: 2024-11-19
Payer: COMMERCIAL

## 2024-11-19 ENCOUNTER — TELEPHONE (OUTPATIENT)
Dept: FAMILY MEDICINE CLINIC | Age: 39
End: 2024-11-19

## 2024-11-19 VITALS
DIASTOLIC BLOOD PRESSURE: 66 MMHG | RESPIRATION RATE: 16 BRPM | HEIGHT: 67 IN | BODY MASS INDEX: 27.94 KG/M2 | OXYGEN SATURATION: 100 % | SYSTOLIC BLOOD PRESSURE: 114 MMHG | TEMPERATURE: 97.9 F | HEART RATE: 74 BPM | WEIGHT: 178 LBS

## 2024-11-19 DIAGNOSIS — G43.009 MIGRAINE WITHOUT AURA AND WITHOUT STATUS MIGRAINOSUS, NOT INTRACTABLE: ICD-10-CM

## 2024-11-19 DIAGNOSIS — I10 HYPERTENSION, UNSPECIFIED TYPE: Primary | ICD-10-CM

## 2024-11-19 LAB
ANION GAP SERPL CALCULATED.3IONS-SCNC: 9 MMOL/L (ref 3–16)
BASOPHILS # BLD: 0 K/UL (ref 0–0.2)
BASOPHILS NFR BLD: 0.7 %
BILIRUB UR QL STRIP.AUTO: NEGATIVE
BUN SERPL-MCNC: 12 MG/DL (ref 7–20)
CALCIUM SERPL-MCNC: 9 MG/DL (ref 8.3–10.6)
CHLORIDE SERPL-SCNC: 105 MMOL/L (ref 99–110)
CLARITY UR: CLEAR
CO2 SERPL-SCNC: 25 MMOL/L (ref 21–32)
COLOR UR: YELLOW
CREAT SERPL-MCNC: 0.8 MG/DL (ref 0.6–1.1)
DEPRECATED RDW RBC AUTO: 15.1 % (ref 12.4–15.4)
EOSINOPHIL # BLD: 0.1 K/UL (ref 0–0.6)
EOSINOPHIL NFR BLD: 3 %
GFR SERPLBLD CREATININE-BSD FMLA CKD-EPI: >90 ML/MIN/{1.73_M2}
GLUCOSE SERPL-MCNC: 98 MG/DL (ref 70–99)
GLUCOSE UR STRIP.AUTO-MCNC: NEGATIVE MG/DL
HCT VFR BLD AUTO: 33.4 % (ref 36–48)
HGB BLD-MCNC: 11.1 G/DL (ref 12–16)
HGB UR QL STRIP.AUTO: NEGATIVE
KETONES UR STRIP.AUTO-MCNC: NEGATIVE MG/DL
LEUKOCYTE ESTERASE UR QL STRIP.AUTO: NEGATIVE
LYMPHOCYTES # BLD: 1.8 K/UL (ref 1–5.1)
LYMPHOCYTES NFR BLD: 44.2 %
MCH RBC QN AUTO: 28.3 PG (ref 26–34)
MCHC RBC AUTO-ENTMCNC: 33.2 G/DL (ref 31–36)
MCV RBC AUTO: 85.4 FL (ref 80–100)
MONOCYTES # BLD: 0.3 K/UL (ref 0–1.3)
MONOCYTES NFR BLD: 6.5 %
NEUTROPHILS # BLD: 1.8 K/UL (ref 1.7–7.7)
NEUTROPHILS NFR BLD: 45.6 %
NITRITE UR QL STRIP.AUTO: NEGATIVE
NT-PROBNP SERPL-MCNC: 59 PG/ML (ref 0–124)
PH UR STRIP.AUTO: 6 [PH] (ref 5–8)
PLATELET # BLD AUTO: 211 K/UL (ref 135–450)
PMV BLD AUTO: 8.3 FL (ref 5–10.5)
POTASSIUM SERPL-SCNC: 3.8 MMOL/L (ref 3.5–5.1)
PROT UR STRIP.AUTO-MCNC: NEGATIVE MG/DL
RBC # BLD AUTO: 3.91 M/UL (ref 4–5.2)
SODIUM SERPL-SCNC: 139 MMOL/L (ref 136–145)
SP GR UR STRIP.AUTO: <=1.005 (ref 1–1.03)
TROPONIN, HIGH SENSITIVITY: 7 NG/L (ref 0–14)
UA COMPLETE W REFLEX CULTURE PNL UR: NORMAL
UA DIPSTICK W REFLEX MICRO PNL UR: NORMAL
URN SPEC COLLECT METH UR: NORMAL
UROBILINOGEN UR STRIP-ACNC: 0.2 E.U./DL
WBC # BLD AUTO: 4 K/UL (ref 4–11)

## 2024-11-19 PROCEDURE — 80048 BASIC METABOLIC PNL TOTAL CA: CPT

## 2024-11-19 PROCEDURE — 81003 URINALYSIS AUTO W/O SCOPE: CPT

## 2024-11-19 PROCEDURE — 83880 ASSAY OF NATRIURETIC PEPTIDE: CPT

## 2024-11-19 PROCEDURE — 99285 EMERGENCY DEPT VISIT HI MDM: CPT

## 2024-11-19 PROCEDURE — 71045 X-RAY EXAM CHEST 1 VIEW: CPT

## 2024-11-19 PROCEDURE — 84484 ASSAY OF TROPONIN QUANT: CPT

## 2024-11-19 PROCEDURE — 85025 COMPLETE CBC W/AUTO DIFF WBC: CPT

## 2024-11-19 RX ORDER — PROMETHAZINE HYDROCHLORIDE 6.25 MG/5ML
12.5 SYRUP ORAL 4 TIMES DAILY PRN
Qty: 240 ML | Refills: 0 | Status: ON HOLD | OUTPATIENT
Start: 2024-11-19 | End: 2024-11-24

## 2024-11-19 ASSESSMENT — ENCOUNTER SYMPTOMS
SHORTNESS OF BREATH: 0
COUGH: 0
EYE PAIN: 0
NAUSEA: 0
VOMITING: 0
EYES NEGATIVE: 1
SORE THROAT: 0
SINUS PRESSURE: 0
DIARRHEA: 0
CHEST TIGHTNESS: 0
ABDOMINAL PAIN: 0
ABDOMINAL PAIN: 0
WHEEZING: 0
EYE ITCHING: 0
SHORTNESS OF BREATH: 0
RHINORRHEA: 0

## 2024-11-19 ASSESSMENT — PAIN - FUNCTIONAL ASSESSMENT: PAIN_FUNCTIONAL_ASSESSMENT: NONE - DENIES PAIN

## 2024-11-19 NOTE — PROGRESS NOTES
Melissa Montilla (:  1985) is a 39 y.o. female,Established patient, here for evaluation of the following chief complaint(s):  Follow-up (Follow up)          Subjective   SUBJECTIVE/OBJECTIVE:  HPI  39-year-old female patient here for ED follow-up  Patient was seen in the ED due to acute anaphylaxis secondary to walnut  consumption.  Records reviewed.  Patient has hypertension and is taking Coreg and Aldactone for the same, blood pressure is well-controlled  Patient says she has daily migraine headaches and is inquiring about Topamax.  She also complains of tingling in hands and feet most of the days    Review of Systems   Constitutional: Negative.  Negative for fatigue.   HENT:  Negative for congestion, ear pain, rhinorrhea, sinus pressure and sore throat.    Eyes: Negative.  Negative for pain and itching.   Respiratory:  Negative for cough, shortness of breath and wheezing.    Cardiovascular:  Negative for chest pain and palpitations.   Gastrointestinal:  Negative for abdominal pain.   Genitourinary:  Negative for frequency and urgency.   Musculoskeletal:  Negative for gait problem.   Skin:  Negative for rash.   Neurological:  Positive for headaches. Negative for dizziness.   Psychiatric/Behavioral:  The patient is not nervous/anxious.           Objective   Physical Exam  Constitutional:       Appearance: Normal appearance.   HENT:      Head: Normocephalic and atraumatic.      Right Ear: Tympanic membrane, ear canal and external ear normal.      Left Ear: Tympanic membrane, ear canal and external ear normal.      Nose: Nose normal. No congestion or rhinorrhea.      Mouth/Throat:      Mouth: Mucous membranes are moist.      Pharynx: Oropharynx is clear. No oropharyngeal exudate or posterior oropharyngeal erythema.   Eyes:      Extraocular Movements: Extraocular movements intact.      Conjunctiva/sclera: Conjunctivae normal.      Pupils: Pupils are equal, round, and reactive to light.   Neck:

## 2024-11-19 NOTE — ED PROVIDER NOTES
Hemoglobin 11.1 (L) 12.0 - 16.0 g/dL    Hematocrit 33.4 (L) 36.0 - 48.0 %    MCV 85.4 80.0 - 100.0 fL    MCH 28.3 26.0 - 34.0 pg    MCHC 33.2 31.0 - 36.0 g/dL    RDW 15.1 12.4 - 15.4 %    Platelets 211 135 - 450 K/uL    MPV 8.3 5.0 - 10.5 fL    Neutrophils % 45.6 %    Lymphocytes % 44.2 %    Monocytes % 6.5 %    Eosinophils % 3.0 %    Basophils % 0.7 %    Neutrophils Absolute 1.8 1.7 - 7.7 K/uL    Lymphocytes Absolute 1.8 1.0 - 5.1 K/uL    Monocytes Absolute 0.3 0.0 - 1.3 K/uL    Eosinophils Absolute 0.1 0.0 - 0.6 K/uL    Basophils Absolute 0.0 0.0 - 0.2 K/uL   Basic Metabolic Panel   Result Value Ref Range    Sodium 139 136 - 145 mmol/L    Potassium 3.8 3.5 - 5.1 mmol/L    Chloride 105 99 - 110 mmol/L    CO2 25 21 - 32 mmol/L    Anion Gap 9 3 - 16    Glucose 98 70 - 99 mg/dL    BUN 12 7 - 20 mg/dL    Creatinine 0.8 0.6 - 1.1 mg/dL    Est, Glom Filt Rate >90 >60    Calcium 9.0 8.3 - 10.6 mg/dL   Urinalysis with Reflex to Culture    Specimen: Urine   Result Value Ref Range    Color, UA Yellow Straw/Yellow    Clarity, UA Clear Clear    Glucose, Ur Negative Negative mg/dL    Bilirubin, Urine Negative Negative    Ketones, Urine Negative Negative mg/dL    Specific Gravity, UA <=1.005 1.005 - 1.030    Blood, Urine Negative Negative    pH, Urine 6.0 5.0 - 8.0    Protein, UA Negative Negative mg/dL    Urobilinogen, Urine 0.2 <2.0 E.U./dL    Nitrite, Urine Negative Negative    Leukocyte Esterase, Urine Negative Negative    Microscopic Examination Not Indicated     Urine Type NotGiven     Urine Reflex to Culture Not Indicated          IMAGING RESULTS     XR CHEST PORTABLE   Final Result   1. No acute cardiopulmonary disease.         Any applicable radiology studies including x-ray, CT, MRI, and/or ultrasound were reviewed independently by me in addition to the radiologist.  I reviewed all radiology images and reports as well from this evaluation.        MEDICAL DECISION MAKING     In addition to the advanced practice 
PORTABLE   Final Result   1. No acute cardiopulmonary disease.           No results found.    No results found.    PROCEDURES   Unless otherwise noted below, none     Procedures    CRITICAL CARE TIME (.cctime)   none    PAST MEDICAL HISTORY      has a past medical history of Allergic rhinitis and Ectopic pregnancy.     Chronic Conditions affecting Care: none    EMERGENCY DEPARTMENT COURSE and DIFFERENTIAL DIAGNOSIS/MDM:   Vitals:    Vitals:    11/19/24 0011 11/19/24 0055   BP: (!) 153/91 114/66   Pulse: 74    Resp: 16    Temp: 97.9 °F (36.6 °C)    SpO2: 100%    Weight: 80.7 kg (178 lb)    Height: 1.702 m (5' 7\")        Patient was given the following medications:  Medications - No data to display    ED Course as of 11/19/24 0323   Tue Nov 19, 2024   0113 BP(!): 153/91 [PB]   0113 BP: 114/66  Spontaneously improved. [PB]   0152 WBC: 4.0 [PB]   0152 Hemoglobin Quant(!): 11.1 [PB]   0152 Nitrite, Urine: Negative [PB]   0152 Leukocyte Esterase, Urine: Negative [PB]   0201 Potassium: 3.8 [PB]   0202 Creatinine: 0.8 [PB]   0202 NT Pro-BNP: 59 [PB]   0202 Troponin, High Sensitivity: 7  Symptom onset > 6 hours prior to arrival. [PB]   0256 Patient was revisited at bedside.  Remains in stable condition.  Discussed all results and plan for discharge including continued home care and follow-up with patient's primary care provider.  Red flag signs/symptoms discussed, and strict return precautions given.  Utilized shared decision-making regarding disposition of discharge, and patient is a good candidate for outpatient management.  Patient verbalized agreement with plan for discharge.   [PB]      ED Course User Index  [PB] Yazan Nelson, DO        Is this patient to be included in the SEP-1 Core Measure due to severe sepsis or septic shock?   No   Exclusion criteria - the patient is NOT to be included for SEP-1 Core Measure due to:  Infection is not suspected    CONSULTS: (Who and What was discussed)  None  Discussion with

## 2024-11-19 NOTE — TELEPHONE ENCOUNTER
Pt requesting   promethazine (PHENERGAN) 6.25 MG/5ML syrup   [id number] - [place], [place] - [address] [place] [address] - P [phone] - F [phone]  [address] [facility] [address]  Phone: [phone]  Fax: [phone]

## 2024-11-21 RX ORDER — CARVEDILOL 12.5 MG/1
12.5 TABLET ORAL 2 TIMES DAILY
Qty: 60 TABLET | Refills: 1 | Status: ON HOLD | OUTPATIENT
Start: 2024-11-21

## 2024-11-24 ENCOUNTER — HOSPITAL ENCOUNTER (INPATIENT)
Age: 39
LOS: 1 days | Discharge: HOME | DRG: 641 | End: 2024-11-25
Attending: EMERGENCY MEDICINE | Admitting: FAMILY MEDICINE
Payer: COMMERCIAL

## 2024-11-24 ENCOUNTER — APPOINTMENT (OUTPATIENT)
Dept: GENERAL RADIOLOGY | Age: 39
DRG: 641 | End: 2024-11-24
Payer: COMMERCIAL

## 2024-11-24 DIAGNOSIS — E87.1 ACUTE HYPONATREMIA: ICD-10-CM

## 2024-11-24 DIAGNOSIS — I20.9 ANGINA PECTORIS (HCC): ICD-10-CM

## 2024-11-24 DIAGNOSIS — I10 HYPERTENSION, UNSPECIFIED TYPE: ICD-10-CM

## 2024-11-24 DIAGNOSIS — E87.1 HYPONATREMIA: Primary | ICD-10-CM

## 2024-11-24 DIAGNOSIS — R07.9 CHEST PAIN, UNSPECIFIED TYPE: ICD-10-CM

## 2024-11-24 LAB
ALBUMIN SERPL-MCNC: 4.1 G/DL (ref 3.4–5)
ALBUMIN SERPL-MCNC: 4.3 G/DL (ref 3.4–5)
ALBUMIN/GLOB SERPL: 1.4 {RATIO} (ref 1.1–2.2)
ALP SERPL-CCNC: 47 U/L (ref 40–129)
ALT SERPL-CCNC: 13 U/L (ref 10–40)
ANION GAP SERPL CALCULATED.3IONS-SCNC: 11 MMOL/L (ref 3–16)
ANION GAP SERPL CALCULATED.3IONS-SCNC: 11 MMOL/L (ref 3–16)
ANION GAP SERPL CALCULATED.3IONS-SCNC: 14 MMOL/L (ref 3–16)
AST SERPL-CCNC: 17 U/L (ref 15–37)
BASOPHILS # BLD: 0 K/UL (ref 0–0.2)
BASOPHILS NFR BLD: 0.7 %
BILIRUB SERPL-MCNC: <0.2 MG/DL (ref 0–1)
BUN SERPL-MCNC: 9 MG/DL (ref 7–20)
CALCIUM SERPL-MCNC: 8.8 MG/DL (ref 8.3–10.6)
CALCIUM SERPL-MCNC: 8.9 MG/DL (ref 8.3–10.6)
CALCIUM SERPL-MCNC: 9.1 MG/DL (ref 8.3–10.6)
CHLORIDE SERPL-SCNC: 92 MMOL/L (ref 99–110)
CHLORIDE SERPL-SCNC: 94 MMOL/L (ref 99–110)
CHLORIDE SERPL-SCNC: 97 MMOL/L (ref 99–110)
CO2 SERPL-SCNC: 19 MMOL/L (ref 21–32)
CO2 SERPL-SCNC: 21 MMOL/L (ref 21–32)
CO2 SERPL-SCNC: 22 MMOL/L (ref 21–32)
CREAT SERPL-MCNC: 0.6 MG/DL (ref 0.6–1.1)
CREAT SERPL-MCNC: 0.7 MG/DL (ref 0.6–1.1)
CREAT SERPL-MCNC: 0.7 MG/DL (ref 0.6–1.1)
DEPRECATED RDW RBC AUTO: 14.7 % (ref 12.4–15.4)
EKG ATRIAL RATE: 83 BPM
EKG DIAGNOSIS: NORMAL
EKG P AXIS: 64 DEGREES
EKG P-R INTERVAL: 124 MS
EKG Q-T INTERVAL: 378 MS
EKG QRS DURATION: 86 MS
EKG QTC CALCULATION (BAZETT): 444 MS
EKG R AXIS: 28 DEGREES
EKG T AXIS: 27 DEGREES
EKG VENTRICULAR RATE: 83 BPM
EOSINOPHIL # BLD: 0.1 K/UL (ref 0–0.6)
EOSINOPHIL NFR BLD: 2 %
GFR SERPLBLD CREATININE-BSD FMLA CKD-EPI: >90 ML/MIN/{1.73_M2}
GLUCOSE SERPL-MCNC: 110 MG/DL (ref 70–99)
GLUCOSE SERPL-MCNC: 116 MG/DL (ref 70–99)
GLUCOSE SERPL-MCNC: 117 MG/DL (ref 70–99)
HCG SERPL QL: NEGATIVE
HCT VFR BLD AUTO: 33.8 % (ref 36–48)
HGB BLD-MCNC: 11 G/DL (ref 12–16)
LYMPHOCYTES # BLD: 1.2 K/UL (ref 1–5.1)
LYMPHOCYTES NFR BLD: 23 %
MCH RBC QN AUTO: 27.3 PG (ref 26–34)
MCHC RBC AUTO-ENTMCNC: 32.7 G/DL (ref 31–36)
MCV RBC AUTO: 83.5 FL (ref 80–100)
MONOCYTES # BLD: 0.3 K/UL (ref 0–1.3)
MONOCYTES NFR BLD: 6 %
NEUTROPHILS # BLD: 3.7 K/UL (ref 1.7–7.7)
NEUTROPHILS NFR BLD: 68.3 %
PHOSPHATE SERPL-MCNC: 1.9 MG/DL (ref 2.5–4.9)
PLATELET # BLD AUTO: 234 K/UL (ref 135–450)
PMV BLD AUTO: 8.4 FL (ref 5–10.5)
POTASSIUM SERPL-SCNC: 3.6 MMOL/L (ref 3.5–5.1)
POTASSIUM SERPL-SCNC: 3.7 MMOL/L (ref 3.5–5.1)
POTASSIUM SERPL-SCNC: 4.2 MMOL/L (ref 3.5–5.1)
PROT SERPL-MCNC: 7.4 G/DL (ref 6.4–8.2)
RBC # BLD AUTO: 4.04 M/UL (ref 4–5.2)
SODIUM SERPL-SCNC: 125 MMOL/L (ref 136–145)
SODIUM SERPL-SCNC: 127 MMOL/L (ref 136–145)
SODIUM SERPL-SCNC: 129 MMOL/L (ref 136–145)
TROPONIN, HIGH SENSITIVITY: <6 NG/L (ref 0–14)
TROPONIN, HIGH SENSITIVITY: <6 NG/L (ref 0–14)
WBC # BLD AUTO: 5.4 K/UL (ref 4–11)

## 2024-11-24 PROCEDURE — 93005 ELECTROCARDIOGRAM TRACING: CPT | Performed by: EMERGENCY MEDICINE

## 2024-11-24 PROCEDURE — 6370000000 HC RX 637 (ALT 250 FOR IP): Performed by: FAMILY MEDICINE

## 2024-11-24 PROCEDURE — 93010 ELECTROCARDIOGRAM REPORT: CPT | Performed by: INTERNAL MEDICINE

## 2024-11-24 PROCEDURE — 71046 X-RAY EXAM CHEST 2 VIEWS: CPT

## 2024-11-24 PROCEDURE — 80053 COMPREHEN METABOLIC PANEL: CPT

## 2024-11-24 PROCEDURE — 1200000000 HC SEMI PRIVATE

## 2024-11-24 PROCEDURE — 84484 ASSAY OF TROPONIN QUANT: CPT

## 2024-11-24 PROCEDURE — 85025 COMPLETE CBC W/AUTO DIFF WBC: CPT

## 2024-11-24 PROCEDURE — 84703 CHORIONIC GONADOTROPIN ASSAY: CPT

## 2024-11-24 PROCEDURE — 2580000003 HC RX 258: Performed by: FAMILY MEDICINE

## 2024-11-24 PROCEDURE — 99285 EMERGENCY DEPT VISIT HI MDM: CPT

## 2024-11-24 RX ORDER — ACETAMINOPHEN 650 MG/1
650 SUPPOSITORY RECTAL EVERY 6 HOURS PRN
Status: DISCONTINUED | OUTPATIENT
Start: 2024-11-24 | End: 2024-11-25 | Stop reason: HOSPADM

## 2024-11-24 RX ORDER — HYDROXYZINE PAMOATE 25 MG/1
50 CAPSULE ORAL ONCE
Status: DISCONTINUED | OUTPATIENT
Start: 2024-11-24 | End: 2024-11-24 | Stop reason: HOSPADM

## 2024-11-24 RX ORDER — KETOROLAC TROMETHAMINE 15 MG/ML
15 INJECTION, SOLUTION INTRAMUSCULAR; INTRAVENOUS ONCE
Status: DISCONTINUED | OUTPATIENT
Start: 2024-11-24 | End: 2024-11-24 | Stop reason: HOSPADM

## 2024-11-24 RX ORDER — CARVEDILOL 6.25 MG/1
12.5 TABLET ORAL 2 TIMES DAILY
Status: DISCONTINUED | OUTPATIENT
Start: 2024-11-24 | End: 2024-11-25 | Stop reason: HOSPADM

## 2024-11-24 RX ORDER — POTASSIUM CHLORIDE 7.45 MG/ML
10 INJECTION INTRAVENOUS PRN
Status: DISCONTINUED | OUTPATIENT
Start: 2024-11-24 | End: 2024-11-25 | Stop reason: HOSPADM

## 2024-11-24 RX ORDER — MAGNESIUM SULFATE IN WATER 40 MG/ML
2000 INJECTION, SOLUTION INTRAVENOUS PRN
Status: DISCONTINUED | OUTPATIENT
Start: 2024-11-24 | End: 2024-11-25 | Stop reason: HOSPADM

## 2024-11-24 RX ORDER — ASPIRIN 81 MG/1
81 TABLET, CHEWABLE ORAL DAILY
Status: DISCONTINUED | OUTPATIENT
Start: 2024-11-25 | End: 2024-11-25 | Stop reason: HOSPADM

## 2024-11-24 RX ORDER — ONDANSETRON 4 MG/1
4 TABLET, ORALLY DISINTEGRATING ORAL EVERY 8 HOURS PRN
Status: DISCONTINUED | OUTPATIENT
Start: 2024-11-24 | End: 2024-11-25 | Stop reason: HOSPADM

## 2024-11-24 RX ORDER — PROMETHAZINE HYDROCHLORIDE 6.25 MG/5ML
12.5 SYRUP ORAL 4 TIMES DAILY PRN
Status: DISCONTINUED | OUTPATIENT
Start: 2024-11-24 | End: 2024-11-24

## 2024-11-24 RX ORDER — POTASSIUM CHLORIDE 1500 MG/1
40 TABLET, EXTENDED RELEASE ORAL PRN
Status: DISCONTINUED | OUTPATIENT
Start: 2024-11-24 | End: 2024-11-25 | Stop reason: HOSPADM

## 2024-11-24 RX ORDER — SODIUM CHLORIDE 0.9 % (FLUSH) 0.9 %
5-40 SYRINGE (ML) INJECTION PRN
Status: DISCONTINUED | OUTPATIENT
Start: 2024-11-24 | End: 2024-11-25 | Stop reason: HOSPADM

## 2024-11-24 RX ORDER — DOBUTAMINE HYDROCHLORIDE 200 MG/100ML
10 INJECTION INTRAVENOUS CONTINUOUS
Status: CANCELLED | OUTPATIENT
Start: 2024-11-24

## 2024-11-24 RX ORDER — SPIRONOLACTONE 25 MG/1
12.5 TABLET ORAL DAILY
Status: DISCONTINUED | OUTPATIENT
Start: 2024-11-24 | End: 2024-11-24

## 2024-11-24 RX ORDER — SODIUM CHLORIDE 0.9 % (FLUSH) 0.9 %
5-40 SYRINGE (ML) INJECTION EVERY 12 HOURS SCHEDULED
Status: DISCONTINUED | OUTPATIENT
Start: 2024-11-24 | End: 2024-11-25 | Stop reason: HOSPADM

## 2024-11-24 RX ORDER — DIPHENHYDRAMINE HCL 25 MG
25 CAPSULE ORAL EVERY 6 HOURS PRN
Status: DISCONTINUED | OUTPATIENT
Start: 2024-11-24 | End: 2024-11-24

## 2024-11-24 RX ORDER — SODIUM CHLORIDE 9 MG/ML
INJECTION, SOLUTION INTRAVENOUS PRN
Status: DISCONTINUED | OUTPATIENT
Start: 2024-11-24 | End: 2024-11-25 | Stop reason: HOSPADM

## 2024-11-24 RX ORDER — TOPIRAMATE 25 MG/1
25 TABLET, FILM COATED ORAL NIGHTLY
Status: DISCONTINUED | OUTPATIENT
Start: 2024-11-24 | End: 2024-11-25 | Stop reason: HOSPADM

## 2024-11-24 RX ORDER — ENOXAPARIN SODIUM 100 MG/ML
40 INJECTION SUBCUTANEOUS DAILY
Status: DISCONTINUED | OUTPATIENT
Start: 2024-11-24 | End: 2024-11-25 | Stop reason: HOSPADM

## 2024-11-24 RX ORDER — ONDANSETRON 2 MG/ML
4 INJECTION INTRAMUSCULAR; INTRAVENOUS EVERY 6 HOURS PRN
Status: DISCONTINUED | OUTPATIENT
Start: 2024-11-24 | End: 2024-11-25 | Stop reason: HOSPADM

## 2024-11-24 RX ORDER — SPIRONOLACTONE 25 MG/1
12.5 TABLET ORAL NIGHTLY
Status: DISCONTINUED | OUTPATIENT
Start: 2024-11-24 | End: 2024-11-25 | Stop reason: HOSPADM

## 2024-11-24 RX ORDER — POLYETHYLENE GLYCOL 3350 17 G/17G
17 POWDER, FOR SOLUTION ORAL DAILY PRN
Status: DISCONTINUED | OUTPATIENT
Start: 2024-11-24 | End: 2024-11-25 | Stop reason: HOSPADM

## 2024-11-24 RX ORDER — ACETAMINOPHEN 325 MG/1
650 TABLET ORAL EVERY 6 HOURS PRN
Status: DISCONTINUED | OUTPATIENT
Start: 2024-11-24 | End: 2024-11-25 | Stop reason: HOSPADM

## 2024-11-24 RX ADMIN — SODIUM CHLORIDE, PRESERVATIVE FREE 10 ML: 5 INJECTION INTRAVENOUS at 19:51

## 2024-11-24 RX ADMIN — TOPIRAMATE 25 MG: 25 TABLET, FILM COATED ORAL at 21:14

## 2024-11-24 RX ADMIN — CARVEDILOL 12.5 MG: 6.25 TABLET, FILM COATED ORAL at 19:49

## 2024-11-24 RX ADMIN — CARVEDILOL 12.5 MG: 6.25 TABLET, FILM COATED ORAL at 08:20

## 2024-11-24 RX ADMIN — SODIUM CHLORIDE, PRESERVATIVE FREE 10 ML: 5 INJECTION INTRAVENOUS at 08:21

## 2024-11-24 ASSESSMENT — PAIN DESCRIPTION - LOCATION: LOCATION: HEAD

## 2024-11-24 ASSESSMENT — PAIN SCALES - GENERAL: PAINLEVEL_OUTOF10: 6

## 2024-11-24 ASSESSMENT — HEART SCORE: ECG: NORMAL

## 2024-11-24 ASSESSMENT — PAIN - FUNCTIONAL ASSESSMENT: PAIN_FUNCTIONAL_ASSESSMENT: NONE - DENIES PAIN

## 2024-11-24 ASSESSMENT — PAIN DESCRIPTION - DESCRIPTORS: DESCRIPTORS: HEAVINESS

## 2024-11-24 NOTE — ED NOTES
How does patient ambulate?   [x]Low Fall Risk (ambulates by themselves without support)  []Stand by assist   []Contact Guard   []Front wheel walker  []Wheelchair   []Steady  []Bed bound  []History of Lower Extremity Amputation  []Unknown, did not assess in the emergency department   How does patient take pills?  [x]Whole with Water  []Crushed in applesauce  []Crushed in pudding  []Other  []Unknown no oral medications were given in the ED  Is patient alert?   [x]Alert  []Drowsy but responds to voice  []Doesn't respond to voice but responds to painful stimuli  []Unresponsive  Is patient oriented?   [x]To person  [x]To place  [x]To time  [x]To situation  []Confused  []Agitated  []Follows commands  If patient is disoriented or from a Skill Nursing Facility has family been notified of admission?   []Yes   []No  Patient belongings?   [x]Cell phone  [x]Wallet   []Dentures  [x]Clothing  Any specific patient or family belongings/needs/dynamics?   N/A  Miscellaneous comments/pending orders?  N/A     If there are any additional questions please reach out to the Emergency Department.

## 2024-11-24 NOTE — H&P
Hospital Medicine History & Physical      Date of Admission: 11/24/2024    Date of Service:  Pt seen/examined on 11/24/2024    [x]Admitted to Inpatient with expected LOS greater than two midnights due to medical therapy.  []Placed in Observation status.    Chief Admission Complaint: Chest pain with palpitations    Presenting Admission History:      39 y.o. female with past medical history of hypertension, migraine who presents to the ER with complaints of having chest pain with palpitations that started late in the night around 9 PM at rest.  This started a few hours prior to arrival to the ER.  Patient reported that she had a small energy drink earlier in the evening around 6 PM.  She does not remember the name of the energy drink but she reports that she took only very small amounts.  Patient also reports that she drinks about 10-12 bottles of water daily.  Laboratory test in the ER incidentally shows hyponatremia of 125 with repeat of 129. Troponin and EKG were unremarkable.      Assessment/Plan:      Current Principal Problem:  Acute hyponatremia    Acute hyponatremia: Likely hypovolemic hyponatremia due to drinking excessive water.  Will place patient on fluid restriction, 1800 mL/day.  Recheck sodium level at 12 noon today.  Consider nephrology consultation if no improvement..    Chest pain with palpitations: Could be related to patient's use of energy drink earlier in the evening.  Will rule out acute coronary syndrome.  Will place on cardiac monitoring.  Will order a dobutamine exercise stress echo for the a.m.  Consider cardiology consultation if stress test is abnormal.    Hypertensive disorder: Resume home dose of Coreg, spironolactone.        Discussed management and the need for Hospitalization of the patient w/ the Emergency Department Provider      EKG:  I have reviewed the EKG with the following interpretation: Normal sinus rhythm at 83 bpm, no acute ST-T changes.      Physical Exam Performed:

## 2024-11-24 NOTE — ED PROVIDER NOTES
ED Attending Attestation Note    This patient was seen by the advanced practice provider.     I personally saw the patient. Management plan and care decisions have been discussed with me and I made/approved the management plan and take responsibility for patient management.     Briefly, 39 y.o. female presents with hypertension.  Patient drank an energy drink and felt like her heart was beating fast so she checked her blood pressure at home and states it was high so she presented for evaluation.  Denies focal weakness, dyspnea, vision loss, severe headache.  States that she feels like her heart is racing and that she feels a burning in her chest.    Focused exam:   Gen: 39 y.o. female, NAD, nontoxic appearing  HEENT: NCAT. MMM, PERRL. EOMI.   CV: RRR w/o MRG  Vascular: intact and symmetric radial and DP pulses bilaterally  Lungs: CTAB. No incr WOB.   Abdomen: Soft, nontender, nondistended. No rebound/guarding.   Neuro: awake and alert, speech clear w/o aphasia; intact and symmetric strength and sensation in all 4 extremities, CN 2-12 intact bilaterally; NIHSS 0    MDM:   This is a 39-year-old woman with history of hypertension presenting with elevated blood pressure, chest pain, palpitations, feeling hot.  States she takes her blood pressure medicine as prescribed.  Blood pressure readings were running high at home with numbers in 160s to 170s.  Reports having palpitations, chest discomfort, feeling of warmth, nausea.  High sensitive troponin negative, EKG unremarkable.  Heart score is 1.  On workup patient noted to be hyponatremia with sodium of 125, this was repeated to ensure this was not a lab error and repeat sodium was 129.  Patient reports drinking significant amount of water.  I suspect this is likely etiology of her hyponatremia.  Upon comparison to prior labs, this appears to be acute in nature.  Given acute hyponatremia, will recommend admission, likely needs fluid restriction and repeat assessment of 
and shortness of breath.    Cardiovascular:  Negative for chest pain.   Gastrointestinal:  Negative for abdominal pain, diarrhea, nausea and vomiting.   Genitourinary:  Negative for difficulty urinating, dysuria and hematuria.   Psychiatric/Behavioral:  The patient is nervous/anxious.        Positives and Pertinent negatives as per HPI.     SURGICAL HISTORY     Past Surgical History:   Procedure Laterality Date    ECTOPIC PREGNANCY SURGERY  01/01/2012    tubal; treated surgically in [facility].     LAPAROSCOPY Left     left fallopian tube removed    LAPAROTOMY  01/01/1987    remove swallowed object    SALPINGECTOMY Right 01/01/2012    ectopic       CURRENTMEDICATIONS       Previous Medications    CARVEDILOL (COREG) 12.5 MG TABLET    TAKE 1 TABLET BY MOUTH TWICE DAILY    DIPHENHYDRAMINE (BENADRYL) 25 MG CAPSULE    Take 1 capsule by mouth every 6 hours as needed for Allergies or Itching    EPINEPHRINE (EPIPEN 2-JACINTO) 0.3 MG/0.3ML SOAJ INJECTION    Inject 0.3 mLs into the muscle once for 1 dose Use as directed for allergic reaction    PROMETHAZINE (PHENERGAN) 6.25 MG/5ML SYRUP    Take 10 mLs by mouth 4 times daily as needed for Nausea    SPIRONOLACTONE (ALDACTONE) 25 MG TABLET    Take 0.5 tablets by mouth daily    TOPIRAMATE (TOPAMAX) 25 MG TABLET    Take 1 tablet by mouth nightly       ALLERGIES     Codeine    FAMILYHISTORY       Family History   Problem Relation Age of Onset    High Blood Pressure Mother         SOCIAL HISTORY       Social History     Tobacco Use    Smoking status: Never    Smokeless tobacco: Never   Vaping Use    Vaping status: Never Used   Substance Use Topics    Alcohol use: Never     Comment: occasionally    Drug use: Never       SCREENINGS          [place] Coma Scale  Eye Opening: Spontaneous  Best Verbal Response: Oriented  Best Motor Response: Obeys commands  [place] Coma Scale Score: 15                        WA Assessment  BP: (!) 157/88  Pulse: 87             PHYSICAL EXAM  1 or more Elements

## 2024-11-25 ENCOUNTER — APPOINTMENT (OUTPATIENT)
Age: 39
DRG: 641 | End: 2024-11-25
Payer: COMMERCIAL

## 2024-11-25 VITALS
RESPIRATION RATE: 16 BRPM | OXYGEN SATURATION: 100 % | WEIGHT: 182 LBS | HEIGHT: 67 IN | BODY MASS INDEX: 28.56 KG/M2 | SYSTOLIC BLOOD PRESSURE: 125 MMHG | TEMPERATURE: 98.1 F | DIASTOLIC BLOOD PRESSURE: 81 MMHG | HEART RATE: 72 BPM

## 2024-11-25 LAB
ANION GAP SERPL CALCULATED.3IONS-SCNC: 8 MMOL/L (ref 3–16)
BUN SERPL-MCNC: 10 MG/DL (ref 7–20)
CALCIUM SERPL-MCNC: 8.8 MG/DL (ref 8.3–10.6)
CHLORIDE SERPL-SCNC: 105 MMOL/L (ref 99–110)
CHOLEST SERPL-MCNC: 138 MG/DL (ref 0–199)
CO2 SERPL-SCNC: 25 MMOL/L (ref 21–32)
CREAT SERPL-MCNC: 0.7 MG/DL (ref 0.6–1.1)
DEPRECATED RDW RBC AUTO: 15.2 % (ref 12.4–15.4)
ECHO BSA: 1.98 M2
GFR SERPLBLD CREATININE-BSD FMLA CKD-EPI: >90 ML/MIN/{1.73_M2}
GLUCOSE SERPL-MCNC: 93 MG/DL (ref 70–99)
HCT VFR BLD AUTO: 32.9 % (ref 36–48)
HDLC SERPL-MCNC: 40 MG/DL (ref 40–60)
HGB BLD-MCNC: 10.8 G/DL (ref 12–16)
LDL CHOLESTEROL: 80 MG/DL
MCH RBC QN AUTO: 28.2 PG (ref 26–34)
MCHC RBC AUTO-ENTMCNC: 32.9 G/DL (ref 31–36)
MCV RBC AUTO: 85.6 FL (ref 80–100)
NUC STRESS EJECTION FRACTION: 63 %
NUC STRESS LV EDV: 65 ML (ref 56–104)
NUC STRESS LV ESV: 24 ML (ref 19–49)
NUC STRESS LV MASS: 103 G
PLATELET # BLD AUTO: 205 K/UL (ref 135–450)
PMV BLD AUTO: 8.2 FL (ref 5–10.5)
POTASSIUM SERPL-SCNC: 4 MMOL/L (ref 3.5–5.1)
RBC # BLD AUTO: 3.84 M/UL (ref 4–5.2)
SODIUM SERPL-SCNC: 138 MMOL/L (ref 136–145)
STRESS BASELINE DIAS BP: 76 MMHG
STRESS BASELINE HR: 69 BPM
STRESS BASELINE SYS BP: 118 MMHG
STRESS ESTIMATED WORKLOAD: 8.6 METS
STRESS EXERCISE DUR MIN: 7 MIN
STRESS EXERCISE DUR SEC: 50 SEC
STRESS O2 SAT PEAK: 97 %
STRESS O2 SAT REST: 100 %
STRESS PEAK DIAS BP: 82 MMHG
STRESS PEAK SYS BP: 190 MMHG
STRESS PERCENT HR ACHIEVED: 85 %
STRESS POST PEAK HR: 155 BPM
STRESS RATE PRESSURE PRODUCT: NORMAL BPM*MMHG
STRESS TARGET HR: 182 BPM
TID: 1.2
TRIGL SERPL-MCNC: 92 MG/DL (ref 0–150)
VLDLC SERPL CALC-MCNC: 18 MG/DL
WBC # BLD AUTO: 3.9 K/UL (ref 4–11)

## 2024-11-25 PROCEDURE — 78452 HT MUSCLE IMAGE SPECT MULT: CPT

## 2024-11-25 PROCEDURE — 2580000003 HC RX 258: Performed by: FAMILY MEDICINE

## 2024-11-25 PROCEDURE — 93016 CV STRESS TEST SUPVJ ONLY: CPT | Performed by: INTERNAL MEDICINE

## 2024-11-25 PROCEDURE — 78452 HT MUSCLE IMAGE SPECT MULT: CPT | Performed by: INTERNAL MEDICINE

## 2024-11-25 PROCEDURE — 93017 CV STRESS TEST TRACING ONLY: CPT

## 2024-11-25 PROCEDURE — A9502 TC99M TETROFOSMIN: HCPCS | Performed by: NURSE PRACTITIONER

## 2024-11-25 PROCEDURE — 80061 LIPID PANEL: CPT

## 2024-11-25 PROCEDURE — 3430000000 HC RX DIAGNOSTIC RADIOPHARMACEUTICAL: Performed by: NURSE PRACTITIONER

## 2024-11-25 PROCEDURE — 85027 COMPLETE CBC AUTOMATED: CPT

## 2024-11-25 PROCEDURE — 80048 BASIC METABOLIC PNL TOTAL CA: CPT

## 2024-11-25 PROCEDURE — 36415 COLL VENOUS BLD VENIPUNCTURE: CPT

## 2024-11-25 PROCEDURE — 93018 CV STRESS TEST I&R ONLY: CPT | Performed by: INTERNAL MEDICINE

## 2024-11-25 RX ADMIN — SODIUM CHLORIDE, PRESERVATIVE FREE 10 ML: 5 INJECTION INTRAVENOUS at 09:02

## 2024-11-25 RX ADMIN — TETROFOSMIN 31.6 MILLICURIE: 1.38 INJECTION, POWDER, LYOPHILIZED, FOR SOLUTION INTRAVENOUS at 14:12

## 2024-11-25 RX ADMIN — TETROFOSMIN 10.8 MILLICURIE: 1.38 INJECTION, POWDER, LYOPHILIZED, FOR SOLUTION INTRAVENOUS at 12:55

## 2024-11-25 NOTE — DISCHARGE SUMMARY
[facility]    Patient Demographics    Patient. Melissa Montilla  Date of Birth. 1985  MRN. [id number]     Primary care provider. Rudy Valdivia APRN - CNP  (Tel: [phone])    Admit date: 11/24/2024    Discharge date 11/25/2024  Note Date: 11/25/2024     Reason for Hospitalization.   Chief Complaint   Patient presents with    Hypertension     Pt presents from home, Pt states her BP is high and she checked it because she felt her heart was beating fast, pt states she drank an energy drink         Significant Findings.   Principal Problem:    Acute hyponatremia  Resolved Problems:    * No resolved hospital problems. *       Problems and results from this hospitalization that need follow up.  Hyponatremia:  outpatient follow up with nephrology   HTN:  follow up with PCP ,  aldactone d/c     Significant test results and incidental findings.  Na 125 - 127 - 129 upon admission  138 at d/c    Stress Combined Conclusion: The study is negative for myocardial ischemia. Findings suggest a low risk of cardiac events.    Stress Function: Left ventricular function post-stress is normal. Post-stress ejection fraction is 63%. Stress end diastolic volume: 65 mL. Stress end systolic volume: 24 mL. LV mass: 103.0 g.    Perfusion Comments: LV perfusion is normal.    Perfusion Conclusion: TID ratio is 1.20.    ECG: Resting ECG demonstrates normal sinus rhythm.    ECG: The stress ECG was negative for ischemia.    Stress Test: A Devyn protocol stress test was performed. The patient was stressed for 7 min and 50 sec.  Invasive procedures and treatments.   None     Problem-based Hospital Course.  Sought care in the ED due to palpitations and previous reports of chest pain.  Pt had drank an energy drink prior to admission.  She was noted to have low Na and was admitted  She has been drinking large volumes of

## 2024-11-27 ENCOUNTER — TELEPHONE (OUTPATIENT)
Dept: FAMILY MEDICINE CLINIC | Age: 39
End: 2024-11-27

## 2024-11-27 NOTE — TELEPHONE ENCOUNTER
Care Transitions Initial Follow Up Call    Outreach made within 2 business days of discharge: Yes    Patient: Melissa Montilla Patient : 1985   MRN: [id number]  Reason for Admission: Acute hyponatremia   Discharge Date: 24       Spoke with: patient    Discharge department/facility: [facility]    TCM Interactive Patient Contact:  Was patient able to fill all prescriptions: Yes  Was patient instructed to bring all medications to the follow-up visit: Yes  Is patient taking all medications as directed in the discharge summary? Yes  Does patient understand their discharge instructions: Yes  Does patient have questions or concerns that need addressed prior to 7-14 day follow up office visit: no    Additional needs identified to be addressed with provider  No needs identified             Scheduled appointment with PCP within 7-14 days    Follow Up  Future Appointments   Date Time Provider Department Center   2024  9:00 AM [facility] MRI RM 1 [facility] MRI [facility]   2024  1:00 PM Rudy Valdivia APRN - CNP [facility]   2024  7:30 AM [facility] ECHO 1 [facility]   2024  8:45 AM Shannon Romero DO FF Cardio [facility]   2025  2:15 PM Fred Flores MD FF NEURO Neurology -       Diana Daniel MA

## 2024-11-29 ENCOUNTER — HOSPITAL ENCOUNTER (OUTPATIENT)
Dept: MRI IMAGING | Age: 39
Discharge: HOME | End: 2024-11-29
Payer: COMMERCIAL

## 2024-11-29 DIAGNOSIS — G43.009 MIGRAINE WITHOUT AURA AND WITHOUT STATUS MIGRAINOSUS, NOT INTRACTABLE: ICD-10-CM

## 2024-11-29 PROCEDURE — 70551 MRI BRAIN STEM W/O DYE: CPT

## 2024-12-06 DIAGNOSIS — R20.2 TINGLING: ICD-10-CM

## 2024-12-06 LAB
FOLATE SERPL-MCNC: 13.3 NG/ML (ref 4.78–24.2)
VIT B12 SERPL-MCNC: 243 PG/ML (ref 211–911)

## 2024-12-09 ENCOUNTER — OFFICE VISIT (OUTPATIENT)
Dept: FAMILY MEDICINE CLINIC | Age: 39
End: 2024-12-09

## 2024-12-09 ENCOUNTER — TELEPHONE (OUTPATIENT)
Dept: FAMILY MEDICINE CLINIC | Age: 39
End: 2024-12-09

## 2024-12-09 VITALS
WEIGHT: 181 LBS | SYSTOLIC BLOOD PRESSURE: 130 MMHG | HEIGHT: 67 IN | DIASTOLIC BLOOD PRESSURE: 88 MMHG | TEMPERATURE: 98.7 F | OXYGEN SATURATION: 98 % | BODY MASS INDEX: 28.41 KG/M2

## 2024-12-09 DIAGNOSIS — Z09 HOSPITAL DISCHARGE FOLLOW-UP: ICD-10-CM

## 2024-12-09 DIAGNOSIS — I10 PRIMARY HYPERTENSION: Primary | ICD-10-CM

## 2024-12-09 DIAGNOSIS — E87.1 ACUTE HYPONATREMIA: Primary | ICD-10-CM

## 2024-12-09 DIAGNOSIS — E53.8 VITAMIN B 12 DEFICIENCY: ICD-10-CM

## 2024-12-09 DIAGNOSIS — E87.1 ACUTE HYPONATREMIA: ICD-10-CM

## 2024-12-09 DIAGNOSIS — G43.009 MIGRAINE WITHOUT AURA AND WITHOUT STATUS MIGRAINOSUS, NOT INTRACTABLE: ICD-10-CM

## 2024-12-09 RX ORDER — PROMETHAZINE HYDROCHLORIDE 6.25 MG/5ML
12.5 SYRUP ORAL EVERY 6 HOURS PRN
Qty: 240 ML | Refills: 2 | Status: SHIPPED | OUTPATIENT
Start: 2024-12-09

## 2024-12-09 RX ORDER — CARVEDILOL 6.25 MG/1
12.5 TABLET ORAL 2 TIMES DAILY
Qty: 180 TABLET | Refills: 0 | Status: SHIPPED | OUTPATIENT
Start: 2024-12-09

## 2024-12-09 RX ORDER — LANOLIN ALCOHOL/MO/W.PET/CERES
1000 CREAM (GRAM) TOPICAL DAILY
Qty: 90 TABLET | Refills: 3 | Status: SHIPPED | OUTPATIENT
Start: 2024-12-09

## 2024-12-09 NOTE — PATIENT INSTRUCTIONS
Get labs check next week  Schedule sleep study  Start B 12 supplement, and recheck level in 3 months

## 2024-12-09 NOTE — ASSESSMENT & PLAN NOTE
Chronic, at goal (stable), changed dose form per pt preference, cardiology to recheck bp next week, controlled in office, reinforced importance of scheduling sleep study

## 2024-12-09 NOTE — PROGRESS NOTES
at this time  Outpatient Medications Marked as Taking for the 12/9/24 encounter (Office Visit) with Rudy Valdivia APRN - CNP   Medication Sig Dispense Refill    carvedilol (COREG) 6.25 MG tablet Take 2 tablets by mouth 2 times daily 180 tablet 0    vitamin B-12 (CYANOCOBALAMIN) 1000 MCG tablet Take 1 tablet by mouth daily 90 tablet 3    promethazine (PHENERGAN) 6.25 MG/5ML syrup Take 10 mLs by mouth every 6 hours as needed for Nausea 240 mL 2        Medications patient taking as of now reconciled against medications ordered at time of hospital discharge: Yes    A comprehensive review of systems was negative except for what was noted in the HPI.    Objective:    /88   Temp 98.7 °F (37.1 °C)   Ht 1.702 m (5' 7\")   Wt 82.1 kg (181 lb)   LMP 11/03/2024 (Approximate)   SpO2 98%   BMI 28.35 kg/m²   General Appearance: alert and oriented to person, place and time, well developed and well- nourished, in no acute distress  Skin: warm and dry, no rash or erythema  Head: normocephalic and atraumatic  Eyes: pupils equal, round, and reactive to light, extraocular eye movements intact, conjunctivae normal  ENT: tympanic membrane, external ear and ear canal normal bilaterally, nose without deformity, nasal mucosa and turbinates normal without polyps  Neck: supple and non-tender without mass, no thyromegaly or thyroid nodules, no cervical lymphadenopathy  Pulmonary/Chest: clear to auscultation bilaterally- no wheezes, rales or rhonchi, normal air movement, no respiratory distress  Cardiovascular: normal rate, regular rhythm, normal S1 and S2, no murmurs, rubs, clicks, or gallops, distal pulses intact, no carotid bruits  Abdomen: soft, non-tender, non-distended, normal bowel sounds, no masses or organomegaly  Extremities: no cyanosis, clubbing or edema  Musculoskeletal: normal range of motion, no joint swelling, deformity or tenderness  Neurologic: reflexes normal and symmetric, no cranial nerve deficit, gait,

## 2024-12-15 DIAGNOSIS — G43.009 MIGRAINE WITHOUT AURA AND WITHOUT STATUS MIGRAINOSUS, NOT INTRACTABLE: ICD-10-CM

## 2024-12-16 NOTE — ASSESSMENT & PLAN NOTE
Intermittent palpitations over the past 6 months  48 H Holter monitor July 2024 unremarkable  MCOT 10/2024, normal avg HR, 3 short PATs, no complex or sustained arrhythmia  Recent Stress Test 11/2024 negative  Labs including TSH, CBC unremarkable, CMP with normal renal function and potassium  Continue coreg as prescribed  Encouraged sleep apnea evaluation  Echo results are pending

## 2024-12-16 NOTE — PROGRESS NOTES
tachycardia); the average 83 bpm.  3 short episodes of PATs. Longest PAT Episode 6 beats  PSVC [place] at < 0.01 %      Holter Monitor  7/30/24  48 hour holter monitor:  Min HR: 60  Max HR: 130  AVG HR: 87  0 PVCs  2 isolated PACs  No AF, SVT, VT  No symptoms     Impression:  Sinus rhythm with very rare PACs    NM Stress Test  11/25/2024    Stress Combined Conclusion: The study is negative for myocardial ischemia. Findings suggest a low risk of cardiac events.    Stress Function: Left ventricular function post-stress is normal. Post-stress ejection fraction is 63%. Stress end diastolic volume: 65 mL. Stress end systolic volume: 24 mL. LV mass: 103.0 g.    Perfusion Comments: LV perfusion is normal.    Perfusion Conclusion: TID ratio is 1.20.    ECG: Resting ECG demonstrates normal sinus rhythm.    ECG: The stress ECG was negative for ischemia.    Stress Test: A Devyn protocol stress test was performed. The patient was stressed for 7 min and 50 sec.    ECG:  10/25/2024 NSR normal axis and intervals    Relevant available labs, and cardiovascular diagnostics, documents reviewed.     Thank you for allowing me to participate in the care of this patient.    Shannon Romero DO, MBA [facility]    Scribe's Attestation: This note was scribed in the presence of Dr.Victoria Heather MCDUFFIE by Mayela Mohan, RN     Physician Attestation: The scribe's documentation has been prepared under my direction and personally reviewed by me in its entirety. I confirm that the note above accurately reflects all work, treatment, procedures, and medical decision making performed by me.

## 2024-12-16 NOTE — ASSESSMENT & PLAN NOTE
Suspected sleep apnea  Sleep medicine referral sent last visit  Encouraged patient to call to set up visit

## 2024-12-16 NOTE — ASSESSMENT & PLAN NOTE
BP today is 110/78, normotensive today  Recently spironolactone was discontinued, due to hyponatremia  Patient states occasional nighttime diastolic HTN  Would benefit from diuretic, but did not tolerate HCTZ and spironolactone was discontinued  Educated patient on proper way to check BP, measure also up to 3 readings 5 minutes apart  She plans to have serum Na lab drawn today  Echo results pending

## 2024-12-17 ENCOUNTER — HOSPITAL ENCOUNTER (OUTPATIENT)
Age: 39
Discharge: HOME | End: 2024-12-19
Attending: INTERNAL MEDICINE
Payer: COMMERCIAL

## 2024-12-17 ENCOUNTER — OFFICE VISIT (OUTPATIENT)
Dept: CARDIOLOGY CLINIC | Age: 39
End: 2024-12-17
Attending: INTERNAL MEDICINE
Payer: COMMERCIAL

## 2024-12-17 VITALS
BODY MASS INDEX: 28.72 KG/M2 | HEART RATE: 69 BPM | DIASTOLIC BLOOD PRESSURE: 78 MMHG | SYSTOLIC BLOOD PRESSURE: 110 MMHG | WEIGHT: 183 LBS | OXYGEN SATURATION: 99 % | HEIGHT: 67 IN

## 2024-12-17 VITALS
HEIGHT: 67 IN | BODY MASS INDEX: 28.41 KG/M2 | SYSTOLIC BLOOD PRESSURE: 119 MMHG | DIASTOLIC BLOOD PRESSURE: 76 MMHG | WEIGHT: 181 LBS

## 2024-12-17 DIAGNOSIS — G47.33 OSA (OBSTRUCTIVE SLEEP APNEA): ICD-10-CM

## 2024-12-17 DIAGNOSIS — R00.2 PALPITATIONS: ICD-10-CM

## 2024-12-17 DIAGNOSIS — I10 PRIMARY HYPERTENSION: Primary | ICD-10-CM

## 2024-12-17 DIAGNOSIS — E87.1 ACUTE HYPONATREMIA: ICD-10-CM

## 2024-12-17 DIAGNOSIS — I10 PRIMARY HYPERTENSION: ICD-10-CM

## 2024-12-17 LAB
ECHO AO ROOT DIAM: 2.6 CM
ECHO AO ROOT INDEX: 1.34 CM/M2
ECHO AV AREA PEAK VELOCITY: 2.4 CM2
ECHO AV AREA VTI: 2.7 CM2
ECHO AV AREA/BSA PEAK VELOCITY: 1.2 CM2/M2
ECHO AV AREA/BSA VTI: 1.4 CM2/M2
ECHO AV MEAN GRADIENT: 3 MMHG
ECHO AV MEAN VELOCITY: 0.8 M/S
ECHO AV PEAK GRADIENT: 6 MMHG
ECHO AV PEAK VELOCITY: 1.2 M/S
ECHO AV VELOCITY RATIO: 0.75
ECHO AV VTI: 23.7 CM
ECHO BSA: 1.97 M2
ECHO EST RA PRESSURE: 3 MMHG
ECHO LA AREA 2C: 13.2 CM2
ECHO LA AREA 4C: 14.4 CM2
ECHO LA DIAMETER INDEX: 1.6 CM/M2
ECHO LA DIAMETER: 3.1 CM
ECHO LA MAJOR AXIS: 4.9 CM
ECHO LA MINOR AXIS: 4.7 CM
ECHO LA TO AORTIC ROOT RATIO: 1.19
ECHO LA VOL BP: 33 ML (ref 22–52)
ECHO LA VOL MOD A2C: 30 ML (ref 22–52)
ECHO LA VOL MOD A4C: 34 ML (ref 22–52)
ECHO LA VOL/BSA BIPLANE: 17 ML/M2 (ref 16–34)
ECHO LA VOLUME INDEX MOD A2C: 15 ML/M2 (ref 16–34)
ECHO LA VOLUME INDEX MOD A4C: 18 ML/M2 (ref 16–34)
ECHO LV E' LATERAL VELOCITY: 14.3 CM/S
ECHO LV E' SEPTAL VELOCITY: 10.1 CM/S
ECHO LV EDV A2C: 108 ML
ECHO LV EDV A4C: 95 ML
ECHO LV EDV INDEX A4C: 49 ML/M2
ECHO LV EDV NDEX A2C: 56 ML/M2
ECHO LV EJECTION FRACTION A2C: 75 %
ECHO LV EJECTION FRACTION A4C: 74 %
ECHO LV EJECTION FRACTION BIPLANE: 75 % (ref 55–100)
ECHO LV ESV A2C: 27 ML
ECHO LV ESV A4C: 25 ML
ECHO LV ESV INDEX A2C: 14 ML/M2
ECHO LV ESV INDEX A4C: 13 ML/M2
ECHO LV FRACTIONAL SHORTENING: 42 % (ref 28–44)
ECHO LV INTERNAL DIMENSION DIASTOLE INDEX: 2.47 CM/M2
ECHO LV INTERNAL DIMENSION DIASTOLIC: 4.8 CM (ref 3.9–5.3)
ECHO LV INTERNAL DIMENSION SYSTOLIC INDEX: 1.44 CM/M2
ECHO LV INTERNAL DIMENSION SYSTOLIC: 2.8 CM
ECHO LV IVSD: 0.9 CM (ref 0.6–0.9)
ECHO LV MASS 2D: 137.1 G (ref 67–162)
ECHO LV MASS INDEX 2D: 70.7 G/M2 (ref 43–95)
ECHO LV POSTERIOR WALL DIASTOLIC: 0.8 CM (ref 0.6–0.9)
ECHO LV RELATIVE WALL THICKNESS RATIO: 0.33
ECHO LVOT AREA: 3.1 CM2
ECHO LVOT AV VTI INDEX: 0.87
ECHO LVOT DIAM: 2 CM
ECHO LVOT MEAN GRADIENT: 2 MMHG
ECHO LVOT MEAN GRADIENT: 2 MMHG
ECHO LVOT PEAK GRADIENT: 3 MMHG
ECHO LVOT PEAK VELOCITY: 0.9 M/S
ECHO LVOT STROKE VOLUME INDEX: 33.3 ML/M2
ECHO LVOT SV: 64.7 ML
ECHO LVOT VTI: 20.6 CM
ECHO MV A VELOCITY: 0.61 M/S
ECHO MV AREA VTI: 3 CM2
ECHO MV E DECELERATION TIME (DT): 301 MS
ECHO MV E VELOCITY: 0.88 M/S
ECHO MV E/A RATIO: 1.44
ECHO MV E/E' LATERAL: 6.15
ECHO MV E/E' RATIO (AVERAGED): 7.43
ECHO MV E/E' SEPTAL: 8.71
ECHO MV LVOT VTI INDEX: 1.04
ECHO MV MAX VELOCITY: 1 M/S
ECHO MV MEAN GRADIENT: 1 MMHG
ECHO MV MEAN VELOCITY: 0.5 M/S
ECHO MV PEAK GRADIENT: 4 MMHG
ECHO MV VTI: 21.5 CM
ECHO PULMONARY ARTERY END DIASTOLIC PRESSURE: 3 MMHG
ECHO PV MAX VELOCITY: 1.1 M/S
ECHO PV PEAK GRADIENT: 5 MMHG
ECHO PV REGURGITANT MAX VELOCITY: 0.9 M/S
ECHO RA AREA 4C: 14.6 CM2
ECHO RA END SYSTOLIC VOLUME APICAL 4 CHAMBER INDEX BSA: 19 ML/M2
ECHO RA VOLUME: 37 ML
ECHO RIGHT VENTRICULAR SYSTOLIC PRESSURE (RVSP): 15 MMHG
ECHO RV BASAL DIMENSION: 4.1 CM
ECHO RV FREE WALL PEAK S': 12.2 CM/S
ECHO RV LONGITUDINAL DIMENSION: 7 CM
ECHO RV MID DIMENSION: 2.9 CM
ECHO RV TAPSE: 2.1 CM (ref 1.7–?)
ECHO TV REGURGITANT MAX VELOCITY: 1.74 M/S
ECHO TV REGURGITANT PEAK GRADIENT: 12 MMHG

## 2024-12-17 PROCEDURE — 3078F DIAST BP <80 MM HG: CPT | Performed by: INTERNAL MEDICINE

## 2024-12-17 PROCEDURE — 93306 TTE W/DOPPLER COMPLETE: CPT | Performed by: INTERNAL MEDICINE

## 2024-12-17 PROCEDURE — 93306 TTE W/DOPPLER COMPLETE: CPT

## 2024-12-17 PROCEDURE — 3074F SYST BP LT 130 MM HG: CPT | Performed by: INTERNAL MEDICINE

## 2024-12-17 PROCEDURE — 99214 OFFICE O/P EST MOD 30 MIN: CPT | Performed by: INTERNAL MEDICINE

## 2024-12-17 RX ORDER — TOPIRAMATE 25 MG/1
25 TABLET, FILM COATED ORAL NIGHTLY
Qty: 30 TABLET | Refills: 0 | Status: SHIPPED | OUTPATIENT
Start: 2024-12-17

## 2024-12-17 NOTE — TELEPHONE ENCOUNTER
Medication:   Requested Prescriptions     Pending Prescriptions Disp Refills    topiramate (TOPAMAX) 25 MG tablet [Pharmacy Med Name: TOPIRAMATE 25MG TABLETS] 30 tablet 0     Sig: TAKE 1 TABLET BY MOUTH EVERY NIGHT        Last Filled:      11/18/2024       Patient Phone Number: [phone] (home)     Last appt: 12/9/2024   Next appt: Visit date not found    Last OARRS:        No data to display

## 2024-12-17 NOTE — PATIENT INSTRUCTIONS
Continue all current medications    Continue to monitor your BP at home    Decrease any added salt in your diet    Labs as already ordered    Follow up in 3 months

## 2024-12-18 ENCOUNTER — TELEPHONE (OUTPATIENT)
Dept: CARDIOLOGY CLINIC | Age: 39
End: 2024-12-18

## 2024-12-18 LAB
ALBUMIN SERPL-MCNC: 4.3 G/DL (ref 3.4–5)
ALBUMIN/GLOB SERPL: 1.5 {RATIO} (ref 1.1–2.2)
ALP SERPL-CCNC: 44 U/L (ref 40–129)
ALT SERPL-CCNC: 12 U/L (ref 10–40)
ANION GAP SERPL CALCULATED.3IONS-SCNC: 8 MMOL/L (ref 3–16)
AST SERPL-CCNC: 14 U/L (ref 15–37)
BILIRUB SERPL-MCNC: <0.2 MG/DL (ref 0–1)
BUN SERPL-MCNC: 11 MG/DL (ref 7–20)
CALCIUM SERPL-MCNC: 9.1 MG/DL (ref 8.3–10.6)
CHLORIDE SERPL-SCNC: 105 MMOL/L (ref 99–110)
CO2 SERPL-SCNC: 25 MMOL/L (ref 21–32)
CREAT SERPL-MCNC: 0.8 MG/DL (ref 0.6–1.1)
GFR SERPLBLD CREATININE-BSD FMLA CKD-EPI: >90 ML/MIN/{1.73_M2}
GLUCOSE SERPL-MCNC: 102 MG/DL (ref 70–99)
POTASSIUM SERPL-SCNC: 4.3 MMOL/L (ref 3.5–5.1)
PROT SERPL-MCNC: 7.1 G/DL (ref 6.4–8.2)
SODIUM SERPL-SCNC: 138 MMOL/L (ref 136–145)

## 2024-12-18 NOTE — TELEPHONE ENCOUNTER
----- Message from Dr. Shannon Romero DO sent at 12/17/2024  4:38 PM EST -----  Normal findings for age  Please let patient know

## 2024-12-27 ENCOUNTER — APPOINTMENT (OUTPATIENT)
Dept: GENERAL RADIOLOGY | Age: 39
End: 2024-12-27
Payer: COMMERCIAL

## 2024-12-27 ENCOUNTER — HOSPITAL ENCOUNTER (EMERGENCY)
Age: 39
Discharge: HOME | End: 2024-12-27
Attending: EMERGENCY MEDICINE
Payer: COMMERCIAL

## 2024-12-27 VITALS
TEMPERATURE: 97.5 F | WEIGHT: 181 LBS | HEART RATE: 73 BPM | RESPIRATION RATE: 18 BRPM | SYSTOLIC BLOOD PRESSURE: 107 MMHG | DIASTOLIC BLOOD PRESSURE: 73 MMHG | OXYGEN SATURATION: 96 % | BODY MASS INDEX: 28.35 KG/M2

## 2024-12-27 DIAGNOSIS — R06.02 SHORTNESS OF BREATH: Primary | ICD-10-CM

## 2024-12-27 DIAGNOSIS — I10 HYPERTENSION, UNSPECIFIED TYPE: ICD-10-CM

## 2024-12-27 DIAGNOSIS — Z71.1 FEARED CONDITION NOT DEMONSTRATED: ICD-10-CM

## 2024-12-27 LAB
ALBUMIN SERPL-MCNC: 3.9 G/DL (ref 3.4–5)
ALBUMIN/GLOB SERPL: 1.2 {RATIO} (ref 1.1–2.2)
ALP SERPL-CCNC: 46 U/L (ref 40–129)
ALT SERPL-CCNC: 10 U/L (ref 10–40)
ANION GAP SERPL CALCULATED.3IONS-SCNC: 10 MMOL/L (ref 3–16)
AST SERPL-CCNC: 16 U/L (ref 15–37)
BASOPHILS # BLD: 0.1 K/UL (ref 0–0.2)
BASOPHILS NFR BLD: 1.1 %
BILIRUB SERPL-MCNC: 0.3 MG/DL (ref 0–1)
BUN SERPL-MCNC: 6 MG/DL (ref 7–20)
CALCIUM SERPL-MCNC: 9.1 MG/DL (ref 8.3–10.6)
CHLORIDE SERPL-SCNC: 104 MMOL/L (ref 99–110)
CO2 SERPL-SCNC: 22 MMOL/L (ref 21–32)
CREAT SERPL-MCNC: 0.7 MG/DL (ref 0.6–1.1)
D-DIMER QUANTITATIVE: <0.27 UG/ML FEU (ref 0–0.6)
DEPRECATED RDW RBC AUTO: 14.4 % (ref 12.4–15.4)
EKG ATRIAL RATE: 70 BPM
EKG DIAGNOSIS: NORMAL
EKG P AXIS: 62 DEGREES
EKG P-R INTERVAL: 130 MS
EKG Q-T INTERVAL: 422 MS
EKG QRS DURATION: 90 MS
EKG QTC CALCULATION (BAZETT): 455 MS
EKG R AXIS: 38 DEGREES
EKG T AXIS: 29 DEGREES
EKG VENTRICULAR RATE: 70 BPM
EOSINOPHIL # BLD: 0.1 K/UL (ref 0–0.6)
EOSINOPHIL NFR BLD: 2.7 %
GFR SERPLBLD CREATININE-BSD FMLA CKD-EPI: >90 ML/MIN/{1.73_M2}
GLUCOSE SERPL-MCNC: 118 MG/DL (ref 70–99)
HCG SERPL QL: NEGATIVE
HCT VFR BLD AUTO: 34.2 % (ref 36–48)
HGB BLD-MCNC: 11.3 G/DL (ref 12–16)
LYMPHOCYTES # BLD: 1.4 K/UL (ref 1–5.1)
LYMPHOCYTES NFR BLD: 30.1 %
MCH RBC QN AUTO: 27.6 PG (ref 26–34)
MCHC RBC AUTO-ENTMCNC: 33.2 G/DL (ref 31–36)
MCV RBC AUTO: 83.2 FL (ref 80–100)
MONOCYTES # BLD: 0.3 K/UL (ref 0–1.3)
MONOCYTES NFR BLD: 5.6 %
NEUTROPHILS # BLD: 2.8 K/UL (ref 1.7–7.7)
NEUTROPHILS NFR BLD: 60.5 %
NT-PROBNP SERPL-MCNC: <36 PG/ML (ref 0–124)
PLATELET # BLD AUTO: 231 K/UL (ref 135–450)
PMV BLD AUTO: 8.6 FL (ref 5–10.5)
POTASSIUM SERPL-SCNC: 4.1 MMOL/L (ref 3.5–5.1)
PROT SERPL-MCNC: 7.2 G/DL (ref 6.4–8.2)
RBC # BLD AUTO: 4.11 M/UL (ref 4–5.2)
SODIUM SERPL-SCNC: 136 MMOL/L (ref 136–145)
TROPONIN, HIGH SENSITIVITY: <6 NG/L (ref 0–14)
TROPONIN, HIGH SENSITIVITY: <6 NG/L (ref 0–14)
WBC # BLD AUTO: 4.6 K/UL (ref 4–11)

## 2024-12-27 PROCEDURE — 80053 COMPREHEN METABOLIC PANEL: CPT

## 2024-12-27 PROCEDURE — 84484 ASSAY OF TROPONIN QUANT: CPT

## 2024-12-27 PROCEDURE — 99285 EMERGENCY DEPT VISIT HI MDM: CPT

## 2024-12-27 PROCEDURE — 93010 ELECTROCARDIOGRAM REPORT: CPT | Performed by: INTERNAL MEDICINE

## 2024-12-27 PROCEDURE — 83880 ASSAY OF NATRIURETIC PEPTIDE: CPT

## 2024-12-27 PROCEDURE — 85379 FIBRIN DEGRADATION QUANT: CPT

## 2024-12-27 PROCEDURE — 71045 X-RAY EXAM CHEST 1 VIEW: CPT

## 2024-12-27 PROCEDURE — 85025 COMPLETE CBC W/AUTO DIFF WBC: CPT

## 2024-12-27 PROCEDURE — 84703 CHORIONIC GONADOTROPIN ASSAY: CPT

## 2024-12-27 PROCEDURE — 93005 ELECTROCARDIOGRAM TRACING: CPT | Performed by: EMERGENCY MEDICINE

## 2024-12-27 ASSESSMENT — PAIN - FUNCTIONAL ASSESSMENT: PAIN_FUNCTIONAL_ASSESSMENT: 0-10

## 2024-12-27 ASSESSMENT — PAIN SCALES - GENERAL: PAINLEVEL_OUTOF10: 0

## 2024-12-27 ASSESSMENT — HEART SCORE: ECG: NORMAL

## 2024-12-27 NOTE — ED PROVIDER NOTES
[facility] Emergency Department [facility]    I, Arnel White MD, am the primary clinician of record.    CHIEF COMPLAINT  Chief Complaint   Patient presents with    Hypertension     Reports intermittent hypertension with headache in 180s; hx of hypertension & taking meds as prescribed.          HISTORY OF PRESENT ILLNESS  Melissa Montilla is a 39 y.o. female  who presents to the ED complaining of reportedly having high blood pressure yesterday when she was at work and intermittent shortness of breath.  She is not currently have any chest pains and shortness of breath is not present on initial evaluation here today but does come and go.  She denies any calf pain or DVT or PE history.  She intermittently has headaches but does not currently have a headache.  For this reason she had an MRI of her brain last month which was normal and is suspected to be migrainous in etiology.    Admitted in November 2024 for hyponatremia in the 120's with palpitations/CP, advised on fluid restriction and discontinued her aldactone at that time, had unremarkable stress test for findings of myocardial ischemia.  Also saw cardiology 12/17/24 in follow-up where her BP was 110/78, on Coreg only for now.  She has been compliant with her medications.    On arrival this morning her blood pressure is 117/79.      No other complaints, modifying factors or associated symptoms.     I have reviewed the following from the nursing documentation.    Past Medical History:   Diagnosis Date    Allergic rhinitis     Ectopic pregnancy      Past Surgical History:   Procedure Laterality Date    ECTOPIC PREGNANCY SURGERY  01/01/2012    tubal; treated surgically in [facility].     LAPAROSCOPY Left     left fallopian tube removed    LAPAROTOMY  01/01/1987    remove swallowed object    SALPINGECTOMY Right 01/01/2012    ectopic     Family History   Problem Relation Age of Onset    High Blood Pressure Mother      Social History     Socioeconomic

## 2025-01-03 ENCOUNTER — OFFICE VISIT (OUTPATIENT)
Dept: CARDIOLOGY CLINIC | Age: 40
End: 2025-01-03
Payer: COMMERCIAL

## 2025-01-03 ENCOUNTER — HOSPITAL ENCOUNTER (OUTPATIENT)
Age: 40
Discharge: HOME | End: 2025-01-03

## 2025-01-03 VITALS
WEIGHT: 180 LBS | SYSTOLIC BLOOD PRESSURE: 104 MMHG | HEIGHT: 67 IN | DIASTOLIC BLOOD PRESSURE: 62 MMHG | BODY MASS INDEX: 28.25 KG/M2 | HEART RATE: 79 BPM | OXYGEN SATURATION: 98 %

## 2025-01-03 DIAGNOSIS — R00.2 PALPITATIONS: ICD-10-CM

## 2025-01-03 DIAGNOSIS — I10 PRIMARY HYPERTENSION: Primary | ICD-10-CM

## 2025-01-03 PROCEDURE — 99213 OFFICE O/P EST LOW 20 MIN: CPT | Performed by: NURSE PRACTITIONER

## 2025-01-03 PROCEDURE — 3078F DIAST BP <80 MM HG: CPT | Performed by: NURSE PRACTITIONER

## 2025-01-03 PROCEDURE — 3074F SYST BP LT 130 MM HG: CPT | Performed by: NURSE PRACTITIONER

## 2025-01-03 NOTE — PROGRESS NOTES
[facility]     Outpatient Follow Up Note    Melissa Montilla is 39 y.o. female who presents today with a history of HTN and palpitations / brief PAT. Her other hx includes: hyponatremia      Interval hx: ER : 12/27/24:  presents to the ED complaining of reportedly having high blood pressure yesterday when she was at work and intermittent shortness of breath.  She is not currently have any chest pains and shortness of breath is not present on initial evaluation here today but does come and go.  She denies any calf pain or DVT or PE history.  She intermittently has headaches but does not currently have a headache.  For this reason she had an MRI of her brain last month which was normal and is suspected to be migrainous in etiology.     Admitted in November 2024 for hyponatremia in the 120's with palpitations/CP, advised on fluid restriction and discontinued her aldactone at that time, had unremarkable stress test for findings of myocardial ischemia.  Also saw cardiology 12/17/24 in follow-up where her BP was 110/78, on Coreg only for now.  She has been compliant with her medications.     On arrival this morning her blood pressure is 117/79.    The patient's ED workup was notable for reportedly intermittent shortness of breath however is not tachycardic tachypneic or hypoxic, she is afebrile and has no infectious symptoms.  On arrival she was concerned about high blood pressure based on yesterday's readings that she says were as high as 180s systolic however today it is 117/79 consistent with her cardiology visit earlier this month where it was also normal.  As such I have no inclination to change her Coreg regimen from its current dosage or add or change any other medications at this time.      A chest x-ray was obtained today and clear without edema infiltrate pneumothorax or other cause of shortness of breath.  Her D-dimer is negative and she is low risk for PE so I do not feel that a CT of the

## 2025-01-03 NOTE — PATIENT INSTRUCTIONS
24 hr urine specimen : checking catecholamines     Keep appt with Dr. Romero in March   - former smoker  - maintain SpO2 between 88-92% careful to avoid over-oxygenation - no fever spikes  - per ID fevers likely post procedural  - bld cx NGTD, UA not suspicious for UTI, CXR negative  - Zosyn d/renay per ID reccs - resolved  - per ID fevers were likely post procedural  - bld cx NGTD, Urine cx negative ,CXR negative  - Zosyn d/renay yesterday - now spiking fevers on Zosyn  - also with leukocytosis today wbc 16  - UA with wbc 7, LE+  - f/up bld cx, urine cx  - check  CXR   - ID consult

## 2025-01-07 ENCOUNTER — HOSPITAL ENCOUNTER (OUTPATIENT)
Age: 40
Discharge: HOME | End: 2025-01-07
Payer: COMMERCIAL

## 2025-01-07 DIAGNOSIS — I10 PRIMARY HYPERTENSION: ICD-10-CM

## 2025-01-07 PROCEDURE — 83835 ASSAY OF METANEPHRINES: CPT

## 2025-01-07 PROCEDURE — 84585 ASSAY OF URINE VMA: CPT

## 2025-01-07 PROCEDURE — 82384 ASSAY THREE CATECHOLAMINES: CPT

## 2025-01-08 ENCOUNTER — OFFICE VISIT (OUTPATIENT)
Dept: PULMONOLOGY | Age: 40
End: 2025-01-08
Payer: COMMERCIAL

## 2025-01-08 VITALS
HEIGHT: 67 IN | BODY MASS INDEX: 29.22 KG/M2 | HEART RATE: 80 BPM | WEIGHT: 186.2 LBS | SYSTOLIC BLOOD PRESSURE: 108 MMHG | OXYGEN SATURATION: 100 % | DIASTOLIC BLOOD PRESSURE: 68 MMHG

## 2025-01-08 DIAGNOSIS — I10 PRIMARY HYPERTENSION: ICD-10-CM

## 2025-01-08 DIAGNOSIS — G47.00 INSOMNIA, UNSPECIFIED TYPE: Primary | ICD-10-CM

## 2025-01-08 DIAGNOSIS — R06.83 SNORING: ICD-10-CM

## 2025-01-08 PROCEDURE — 3078F DIAST BP <80 MM HG: CPT | Performed by: STUDENT IN AN ORGANIZED HEALTH CARE EDUCATION/TRAINING PROGRAM

## 2025-01-08 PROCEDURE — 3074F SYST BP LT 130 MM HG: CPT | Performed by: STUDENT IN AN ORGANIZED HEALTH CARE EDUCATION/TRAINING PROGRAM

## 2025-01-08 PROCEDURE — 99204 OFFICE O/P NEW MOD 45 MIN: CPT | Performed by: STUDENT IN AN ORGANIZED HEALTH CARE EDUCATION/TRAINING PROGRAM

## 2025-01-08 RX ORDER — TRAZODONE HYDROCHLORIDE 50 MG/1
50 TABLET, FILM COATED ORAL NIGHTLY
Qty: 30 TABLET | Refills: 0 | Status: SHIPPED | OUTPATIENT
Start: 2025-01-08 | End: 2025-02-07

## 2025-01-08 ASSESSMENT — SLEEP AND FATIGUE QUESTIONNAIRES
HOW LIKELY ARE YOU TO NOD OFF OR FALL ASLEEP WHILE SITTING AND TALKING TO SOMEONE: WOULD NEVER DOZE
HOW LIKELY ARE YOU TO NOD OFF OR FALL ASLEEP WHILE SITTING QUIETLY AFTER LUNCH WITHOUT ALCOHOL: SLIGHT CHANCE OF DOZING
ESS TOTAL SCORE: 4
HOW LIKELY ARE YOU TO NOD OFF OR FALL ASLEEP WHILE SITTING QUIETLY AFTER LUNCH WITHOUT ALCOHOL: SLIGHT CHANCE OF DOZING
HOW LIKELY ARE YOU TO NOD OFF OR FALL ASLEEP WHILE LYING DOWN TO REST IN THE AFTERNOON WHEN CIRCUMSTANCES PERMIT: SLIGHT CHANCE OF DOZING
HOW LIKELY ARE YOU TO NOD OFF OR FALL ASLEEP WHILE SITTING AND READING: WOULD NEVER DOZE
HOW LIKELY ARE YOU TO NOD OFF OR FALL ASLEEP WHILE SITTING INACTIVE IN A PUBLIC PLACE: WOULD NEVER DOZE
HOW LIKELY ARE YOU TO NOD OFF OR FALL ASLEEP WHILE LYING DOWN TO REST IN THE AFTERNOON WHEN CIRCUMSTANCES PERMIT: SLIGHT CHANCE OF DOZING
HOW LIKELY ARE YOU TO NOD OFF OR FALL ASLEEP WHILE WATCHING TV: SLIGHT CHANCE OF DOZING
HOW LIKELY ARE YOU TO NOD OFF OR FALL ASLEEP WHILE WATCHING TV: SLIGHT CHANCE OF DOZING
HOW LIKELY ARE YOU TO NOD OFF OR FALL ASLEEP WHEN YOU ARE A PASSENGER IN A CAR FOR AN HOUR WITHOUT A BREAK: SLIGHT CHANCE OF DOZING
HOW LIKELY ARE YOU TO NOD OFF OR FALL ASLEEP WHILE SITTING INACTIVE IN A PUBLIC PLACE: WOULD NEVER DOZE
HOW LIKELY ARE YOU TO NOD OFF OR FALL ASLEEP WHILE SITTING AND TALKING TO SOMEONE: WOULD NEVER DOZE
HOW LIKELY ARE YOU TO NOD OFF OR FALL ASLEEP WHILE SITTING AND READING: WOULD NEVER DOZE
HOW LIKELY ARE YOU TO NOD OFF OR FALL ASLEEP WHEN YOU ARE A PASSENGER IN A CAR FOR AN HOUR WITHOUT A BREAK: SLIGHT CHANCE OF DOZING
HOW LIKELY ARE YOU TO NOD OFF OR FALL ASLEEP IN A CAR, WHILE STOPPED FOR A FEW MINUTES IN TRAFFIC: WOULD NEVER DOZE
HOW LIKELY ARE YOU TO NOD OFF OR FALL ASLEEP IN A CAR, WHILE STOPPED FOR A FEW MINUTES IN TRAFFIC: WOULD NEVER DOZE

## 2025-01-08 NOTE — PATIENT INSTRUCTIONS
Patient information on the evaluation procedure for sleep apnea:    1. You are going to have a portable sleep study:  This is a home sleep study that will be done to see if you have obstructive sleep apnea. You will have a flow monitor on your nose, belt on your chest and a oxygen/heart rate monitor on your finger. Please do not wear nail polish on day of the study as it will interfere with the oxygen reading probe that is placed on your finger during the study.   Once you receive the device, you will also receive instructions on how to put it on and turn it on.  After 2 nights you will return it.    2. The sleep office will call you with the results a week after the study.     If the study showed that you have obstructive sleep apnea you will be told of the next step in your care. Commonly the recommended treatment is CPAP Therapy. If you agree to this therapy and you receive your CPAP before your next appointment, please bring it with you to your first follow-up appointment.      Patients who have obstructive sleep apnea on the sleep study and have chosen to try CPAP:  A home equipment company will contact you to set you up with a CPAP machine and fit you for a mask.    Please bring your CPAP, the mask and all supplies including the electrical cord with you to all your first follow up appointments with the sleep physician    Please keep trying to use your CPAP until you have seen in the sleep office in follow up as a lot of the problems patients have with CPAP can be addressed and corrected by your sleep provider.    Sleep center contact information:  [facility]: [phone]  [facility]: [phone]             What are the risk factors for Obstructive Sleep Apnea (ROLDAN)?  Obesity  Snoring  Daytime sleepiness  Increasing age  Male gender  Taking sedating medications  Alcohol use  Hypertension  Stroke  Diabetes  Smoking     What is ROLDAN?  People with ROLDAN experience recurrent

## 2025-01-08 NOTE — PROGRESS NOTES
to further evaluate this.    We discussed treatment options and she is willing to consider PAP therapy.  If the study is positive for obstructive sleep apnea, we will therefore proceed with auto CPAP unless in lab PAP titration is indicated based on the sleep study.   She understands that untreated ROLDAN is associated with heart failure, atrial fibrillation, stroke, HTN, Alzheimer's and impaired glucose tolerance.    She should avoid respiratory suppressants as these can worsen sleep disordered breathing.    She should never drive if drowsy and should pull over at a safe place if she becomes drowsy while driving. A handout on drowsy driving tips was given to the patient.    Insomnia: Uncontrolled.  For now, because of how severe her insomnia is, she will be started on a trial of trazodone 50 mg nightly.  Dosing and potential side effects discussed.  This could be contributed by uncontrolled/untreated anxiety.    Hypertension: controlled. Continue present management with current meds.      Overweight/obesity BMI: Weight loss is associated with improvement in sleep disordered breathing. Melissa Montilla should exercise regularly and watch her diet.    No follow-ups on file. Will schedule patient for follow-up after sleep test.    Mir Helton MD  Sleep Medicine  4:22 PM    This dictation was generated by voice recognition computer software.  Although all attempts are made to edit the dictation for accuracy, there may be errors in the transcription that are not intended.

## 2025-01-09 ENCOUNTER — TELEPHONE (OUTPATIENT)
Dept: SLEEP CENTER | Age: 40
End: 2025-01-09

## 2025-01-09 NOTE — TELEPHONE ENCOUNTER
Called to schedule a hst per Shakir     Left vm for the pt to return my call     generic commerical insurance

## 2025-01-10 ENCOUNTER — TELEPHONE (OUTPATIENT)
Dept: CARDIOLOGY CLINIC | Age: 40
End: 2025-01-10

## 2025-01-10 LAB
CATECHOLS UR-IMP: ABNORMAL
COLLECT DURATION TIME SPEC: 24 H
CREAT 24H UR-MCNC: 58 MG/DL
CREAT 24H UR-MRATE: 1218 MG/D (ref 700–1600)
DOPAMINE 24H UR-MRATE: 357 UG/D (ref 71–485)
DOPAMINE UR-MCNC: 170 UG/L
DOPAMINE/CREAT UR: 293 UG/G CRT (ref 0–250)
EPINEPH 24H UR-MRATE: 4 UG/D (ref 1–14)
EPINEPH UR-MCNC: 2 UG/L
EPINEPH/CREAT UR: 3 UG/G CRT (ref 0–20)
METANEPH 24H UR-MCNC: 34 UG/L
METANEPH 24H UR-MRATE: 71 UG/D (ref 36–229)
METANEPH+NORMETANEPH UR-IMP: NORMAL
METANEPH/CREAT 24H UR: 59 UG/G CRT (ref 0–300)
NOREPINEPH 24H UR-MRATE: 40 UG/D (ref 14–120)
NOREPINEPH UR-MCNC: 19 UG/L
NOREPINEPH/CREAT UR: 33 UG/G CRT (ref 0–45)
NORMETANEPHRINE 24H UR-MCNC: 149 UG/L
NORMETANEPHRINE 24H UR-MRATE: 313 UG/D (ref 95–650)
NORMETANEPHRINE/CREAT 24H UR: 257 UG/G CRT (ref 0–400)
SPECIMEN VOL ?TM UR: 2100 ML
VMA & CREAT 24H UR-IMP: NORMAL
VMA 24H UR-MCNC: 1.8 MG/L
VMA 24H UR-MRATE: 3.8 MG/D (ref 0–7)
VMA/CREAT 24H UR: 3 MG/GCR (ref 0–6)

## 2025-01-13 ENCOUNTER — OFFICE VISIT (OUTPATIENT)
Dept: FAMILY MEDICINE CLINIC | Age: 40
End: 2025-01-13
Payer: COMMERCIAL

## 2025-01-13 VITALS
WEIGHT: 186 LBS | SYSTOLIC BLOOD PRESSURE: 110 MMHG | DIASTOLIC BLOOD PRESSURE: 80 MMHG | HEART RATE: 73 BPM | TEMPERATURE: 98.6 F | HEIGHT: 67 IN | BODY MASS INDEX: 29.19 KG/M2 | OXYGEN SATURATION: 99 %

## 2025-01-13 DIAGNOSIS — R73.9 HYPERGLYCEMIA: Primary | ICD-10-CM

## 2025-01-13 PROCEDURE — 3079F DIAST BP 80-89 MM HG: CPT | Performed by: FAMILY MEDICINE

## 2025-01-13 PROCEDURE — 99213 OFFICE O/P EST LOW 20 MIN: CPT | Performed by: FAMILY MEDICINE

## 2025-01-13 PROCEDURE — 3074F SYST BP LT 130 MM HG: CPT | Performed by: FAMILY MEDICINE

## 2025-01-13 SDOH — ECONOMIC STABILITY: FOOD INSECURITY: WITHIN THE PAST 12 MONTHS, YOU WORRIED THAT YOUR FOOD WOULD RUN OUT BEFORE YOU GOT MONEY TO BUY MORE.: NEVER TRUE

## 2025-01-13 SDOH — ECONOMIC STABILITY: FOOD INSECURITY: WITHIN THE PAST 12 MONTHS, THE FOOD YOU BOUGHT JUST DIDN'T LAST AND YOU DIDN'T HAVE MONEY TO GET MORE.: NEVER TRUE

## 2025-01-13 ASSESSMENT — PATIENT HEALTH QUESTIONNAIRE - PHQ9
SUM OF ALL RESPONSES TO PHQ QUESTIONS 1-9: 0
1. LITTLE INTEREST OR PLEASURE IN DOING THINGS: NOT AT ALL
SUM OF ALL RESPONSES TO PHQ9 QUESTIONS 1 & 2: 0
2. FEELING DOWN, DEPRESSED OR HOPELESS: NOT AT ALL
SUM OF ALL RESPONSES TO PHQ QUESTIONS 1-9: 0

## 2025-01-14 ENCOUNTER — TELEPHONE (OUTPATIENT)
Dept: FAMILY MEDICINE CLINIC | Age: 40
End: 2025-01-14

## 2025-01-14 DIAGNOSIS — E88.810 METABOLIC SYNDROME: Primary | ICD-10-CM

## 2025-01-14 DIAGNOSIS — R73.9 HYPERGLYCEMIA: ICD-10-CM

## 2025-01-14 LAB — GLUCOSE P FAST SERPL-MCNC: 93 MG/DL (ref 70–99)

## 2025-01-14 ASSESSMENT — ENCOUNTER SYMPTOMS
SINUS PRESSURE: 0
COUGH: 0
RHINORRHEA: 0
ABDOMINAL PAIN: 0
WHEEZING: 0
SHORTNESS OF BREATH: 0
EYE PAIN: 0
EYE ITCHING: 0
SORE THROAT: 0
EYES NEGATIVE: 1

## 2025-01-14 NOTE — PROGRESS NOTES
Melissa Montilla (:  1985) is a 39 y.o. female,Established patient, here for evaluation of the following chief complaint(s):  Blood Sugar Problem (Pt says her bs was 154)          Subjective   SUBJECTIVE/OBJECTIVE:  HPI  39-year-old female patient here for acute visit.  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  She is not a diabetic.  Hemoglobin A1C   Date Value Ref Range Status   2024 4.9 % Final      On further questioning about what patient ate last night she could not remember..  She says she sometimes drinks orange juice and apple juice.  She is unsure of her meter is correct  Review of Systems   Constitutional: Negative.  Negative for fatigue.   HENT:  Negative for congestion, ear pain, rhinorrhea, sinus pressure and sore throat.    Eyes: Negative.  Negative for pain and itching.   Respiratory:  Negative for cough, shortness of breath and wheezing.    Cardiovascular:  Negative for chest pain and palpitations.   Gastrointestinal:  Negative for abdominal pain.   Genitourinary:  Negative for frequency and urgency.   Musculoskeletal:  Negative for gait problem.   Skin:  Negative for rash.   Neurological:  Negative for dizziness and headaches.   Psychiatric/Behavioral:  The patient is not nervous/anxious.           Objective   Physical Exam  Constitutional:       Appearance: Normal appearance.   HENT:      Head: Normocephalic and atraumatic.   Cardiovascular:      Rate and Rhythm: Normal rate and regular rhythm.   Pulmonary:      Effort: Pulmonary effort is normal.      Breath sounds: Normal breath sounds. No wheezing.   Neurological:      Mental Status: She is alert.   Psychiatric:         Mood and Affect: Mood normal.                  Assessment & Plan   ASSESSMENT/PLAN:  1. Hyperglycemia  -     Glucose, Fasting; Future             An electronic signature was used to authenticate this note.    --Susan Walker MD     This note was generated completely or in part

## 2025-01-15 ENCOUNTER — TELEPHONE (OUTPATIENT)
Dept: PULMONOLOGY | Age: 40
End: 2025-01-15

## 2025-01-15 NOTE — TELEPHONE ENCOUNTER
Pt called returning Dr. Schilling's call. Pt would like a call back at [phone], can call anytime.

## 2025-01-15 NOTE — TELEPHONE ENCOUNTER
I attempted to call patient again but I was not able to leave a VM.  Will try again at a later time.    Mir Helton MD

## 2025-01-15 NOTE — TELEPHONE ENCOUNTER
I attempted to reach patient by phone but unsuccessfully. I left a vm asking patient to call us back and let us know what's the best time to reach them.    This was regarding her insurance denial of any sleep study (HST or PSG).    Mir Helton MD

## 2025-01-16 DIAGNOSIS — G43.009 MIGRAINE WITHOUT AURA AND WITHOUT STATUS MIGRAINOSUS, NOT INTRACTABLE: ICD-10-CM

## 2025-01-16 NOTE — TELEPHONE ENCOUNTER
Medication:   Requested Prescriptions     Pending Prescriptions Disp Refills    topiramate (TOPAMAX) 25 MG tablet [Pharmacy Med Name: TOPIRAMATE 25MG TABLETS] 30 tablet 0     Sig: TAKE 1 TABLET BY MOUTH EVERY NIGHT        Last Filled:  12/17/2024    Patient Phone Number: [phone] (home)     Last appt: 1/13/2025   Next appt: Visit date not found    Last OARRS:        No data to display

## 2025-01-16 NOTE — TELEPHONE ENCOUNTER
Pt called back returning Dr. Schilling's call. Informed pt he is unavailable at the time and we will reach back out when available.    Ph. [phone]

## 2025-01-17 RX ORDER — TOPIRAMATE 25 MG/1
25 TABLET, FILM COATED ORAL NIGHTLY
Qty: 30 TABLET | Refills: 0 | Status: SHIPPED | OUTPATIENT
Start: 2025-01-17

## 2025-01-21 DIAGNOSIS — I10 PRIMARY HYPERTENSION: ICD-10-CM

## 2025-01-21 RX ORDER — CARVEDILOL 6.25 MG/1
12.5 TABLET ORAL 2 TIMES DAILY
Qty: 180 TABLET | Refills: 0 | Status: SHIPPED | OUTPATIENT
Start: 2025-01-21

## 2025-01-21 NOTE — TELEPHONE ENCOUNTER
Medication:   Requested Prescriptions     Pending Prescriptions Disp Refills    carvedilol (COREG) 6.25 MG tablet [Pharmacy Med Name: CARVEDILOL 6.25MG TABLETS] 180 tablet 0     Sig: TAKE 2 TABLETS BY MOUTH TWICE DAILY        Last Filled: 12/09/2024      Patient Phone Number: [phone] (home)     Last appt: 1/13/2025   Next appt: Visit date not found    Last OARRS:        No data to display

## 2025-01-30 ENCOUNTER — OFFICE VISIT (OUTPATIENT)
Dept: NEUROLOGY | Age: 40
End: 2025-01-30
Payer: COMMERCIAL

## 2025-01-30 VITALS
WEIGHT: 186 LBS | HEART RATE: 73 BPM | SYSTOLIC BLOOD PRESSURE: 116 MMHG | DIASTOLIC BLOOD PRESSURE: 70 MMHG | HEIGHT: 67 IN | BODY MASS INDEX: 29.19 KG/M2

## 2025-01-30 DIAGNOSIS — R42 DIZZINESS: ICD-10-CM

## 2025-01-30 DIAGNOSIS — E53.8 VITAMIN B 12 DEFICIENCY: ICD-10-CM

## 2025-01-30 DIAGNOSIS — G43.009 MIGRAINE WITHOUT AURA AND WITHOUT STATUS MIGRAINOSUS, NOT INTRACTABLE: Primary | ICD-10-CM

## 2025-01-30 DIAGNOSIS — I10 PRIMARY HYPERTENSION: ICD-10-CM

## 2025-01-30 PROCEDURE — 3078F DIAST BP <80 MM HG: CPT | Performed by: PSYCHIATRY & NEUROLOGY

## 2025-01-30 PROCEDURE — 3074F SYST BP LT 130 MM HG: CPT | Performed by: PSYCHIATRY & NEUROLOGY

## 2025-01-30 PROCEDURE — 99203 OFFICE O/P NEW LOW 30 MIN: CPT | Performed by: PSYCHIATRY & NEUROLOGY

## 2025-01-30 NOTE — PROGRESS NOTES
The patient is a 39 y.o. years old female who  was referred by Rudy Valdivia APRN - C* for consultation regarding chronic headaches     HPI:    Patient describes chronic headaches since last year.  Symptoms are waxing and waning.  Could be once every few weeks.  Duration minutes to hours.  Degree moderate to severe.  Usually associated with high blood pressure.  Description pulsating pain with nausea, photophobia and phonophobia.  No other associated symptoms.  She is currently on Topamax 25 mg at night.  She reports improvement since November of last year on Topamax.  History of hypertension on Coreg twice daily.  She is not using any CPAP machine.  Last MRI 2 months ago was unremarkable.  No family history of migraine.  No recent fever chills or head trauma.  No weakness or numbness or tingling.  Other review of system was unremarkable.      ROS : A 10-14 system review of constitutional, cardiovascular, respiratory, GI, eyes, , ENT, musculoskeletal, endocrine, skin, SHEENT, genitourinary, psychiatric and neurologic systems was obtained and updated today and is unremarkable except as mentioned in my HPI        Exam:   Constitutional:   Vitals:    01/30/25 1441   BP: 116/70   Pulse: 73   Weight: 84.4 kg (186 lb)   Height: 1.702 m (5' 7.01\")       General appearance:  Normal development and appear in no acute distress.   Mental Status:   Oriented to person, place, problem, and time.    Memory: Good immediate recall.  Intact remote memory  Normal attention span and concentration.  Language: intact naming, repeating and fluency   Good fund of Knowledge. Aware of current events and vocabulary   Cranial Nerves: Pupil round regular and reactive, extraocular muscles are intact, face is symmetric, no ophthalmoplegia, tongue is midline and rest of cranial nerves are normal.     Musculoskeletal: no focal weakness in UE or LL.   Reflexes: Symmetric 2+ in both arms and legs.  Coordination:no abnormal movements   Sensation:

## 2025-01-30 NOTE — PATIENT INSTRUCTIONS
YOU MUST CONFIRM YOUR APPOINTMENT 1 DAY PRIOR OR IT WILL BE CANCELLED!!   Our office will call you 3 times the day prior to your appointment in an attempt to confirm.  Please return our call ASAP or confirm your appt through [facility] no later than 3 pm the day before your appointment.  If we do not hear back from you by 3 pm to confirm, your appointment will be cancelled & someone will be added into that slot from our wait list.

## 2025-02-04 NOTE — TELEPHONE ENCOUNTER
I attempted to reach patient again (4th time) and left a VM asking patient to call our office and schedule an office visit to discuss her sleep study issue.    Mir Helton MD

## 2025-02-05 DIAGNOSIS — G47.00 INSOMNIA, UNSPECIFIED TYPE: ICD-10-CM

## 2025-02-05 RX ORDER — TRAZODONE HYDROCHLORIDE 50 MG/1
50 TABLET, FILM COATED ORAL NIGHTLY
Qty: 30 TABLET | Refills: 1 | Status: SHIPPED | OUTPATIENT
Start: 2025-02-05

## 2025-02-05 NOTE — TELEPHONE ENCOUNTER
Called pt and LVM letting her know script was sent to pharmacy. Also informed pt she will need to schedule a f/u visit (ext appt w/Dr. Schilling).

## 2025-02-12 ENCOUNTER — OFFICE VISIT (OUTPATIENT)
Dept: FAMILY MEDICINE CLINIC | Age: 40
End: 2025-02-12
Payer: COMMERCIAL

## 2025-02-12 VITALS
WEIGHT: 187 LBS | DIASTOLIC BLOOD PRESSURE: 64 MMHG | SYSTOLIC BLOOD PRESSURE: 110 MMHG | BODY MASS INDEX: 29.35 KG/M2 | OXYGEN SATURATION: 98 % | HEIGHT: 67 IN | HEART RATE: 69 BPM | TEMPERATURE: 98.6 F

## 2025-02-12 DIAGNOSIS — I10 PRIMARY HYPERTENSION: ICD-10-CM

## 2025-02-12 DIAGNOSIS — R07.9 RECURRENT CHEST PAIN: Primary | ICD-10-CM

## 2025-02-12 DIAGNOSIS — R06.81 BREATHLESSNESS ON EXERTION: ICD-10-CM

## 2025-02-12 PROCEDURE — 3078F DIAST BP <80 MM HG: CPT | Performed by: FAMILY MEDICINE

## 2025-02-12 PROCEDURE — 3074F SYST BP LT 130 MM HG: CPT | Performed by: FAMILY MEDICINE

## 2025-02-12 PROCEDURE — 93000 ELECTROCARDIOGRAM COMPLETE: CPT | Performed by: FAMILY MEDICINE

## 2025-02-12 PROCEDURE — 99214 OFFICE O/P EST MOD 30 MIN: CPT | Performed by: FAMILY MEDICINE

## 2025-02-12 ASSESSMENT — ENCOUNTER SYMPTOMS
RHINORRHEA: 0
SINUS PRESSURE: 0
EYE ITCHING: 0
EYE PAIN: 0
SHORTNESS OF BREATH: 0
ABDOMINAL PAIN: 0
WHEEZING: 0
COUGH: 0
SORE THROAT: 0
EYES NEGATIVE: 1

## 2025-02-12 NOTE — PROGRESS NOTES
Extraocular Movements: Extraocular movements intact.      Conjunctiva/sclera: Conjunctivae normal.      Pupils: Pupils are equal, round, and reactive to light.   Neck:      Vascular: No carotid bruit.   Cardiovascular:      Rate and Rhythm: Normal rate and regular rhythm.      Pulses: Normal pulses.      Heart sounds: Normal heart sounds, S1 normal and S2 normal. No murmur heard.  Pulmonary:      Effort: Pulmonary effort is normal. No respiratory distress.      Breath sounds: Normal breath sounds. No wheezing, rhonchi or rales.   Abdominal:      General: Bowel sounds are normal. There is no distension.      Palpations: Abdomen is soft.      Tenderness: There is no abdominal tenderness. There is no right CVA tenderness, left CVA tenderness, guarding or rebound.   Musculoskeletal:         General: No swelling or tenderness. Normal range of motion.      Cervical back: Normal range of motion and neck supple. No rigidity or tenderness.      Right lower leg: No edema.      Left lower leg: No edema.   Lymphadenopathy:      Cervical: No cervical adenopathy.   Skin:     General: Skin is warm and dry.      Findings: No bruising or erythema.   Neurological:      General: No focal deficit present.      Mental Status: She is alert and oriented to person, place, and time.   Psychiatric:         Mood and Affect: Mood normal.         Behavior: Behavior normal.                  Assessment & Plan   ASSESSMENT/PLAN:  1. Recurrent chest pain  -     [facility] - oRbbie Casper MD, Cardiology, [facility]  -     Full PFT Study Without Bronchdilator; Future  -     Full PFT Study With Bronchodilator; Future  -     EKG 12 lead  2. Primary hypertension-stable for now  3. Breathlessness on exertion  PFTs ordered               An electronic signature was used to authenticate this note.    --Susan Walker MD     This note was generated completely or in part utilizing Dragon dictation speech recognition software.  Occasionally, words are

## 2025-02-16 DIAGNOSIS — G43.009 MIGRAINE WITHOUT AURA AND WITHOUT STATUS MIGRAINOSUS, NOT INTRACTABLE: ICD-10-CM

## 2025-02-17 NOTE — TELEPHONE ENCOUNTER
Medication:   Requested Prescriptions     Pending Prescriptions Disp Refills    topiramate (TOPAMAX) 25 MG tablet [Pharmacy Med Name: TOPIRAMATE 25MG TABLETS] 30 tablet 0     Sig: TAKE 1 TABLET BY MOUTH EVERY NIGHT        Last Filled:      Patient Phone Number: [phone] (home)     Last appt: 2/12/2025   Next appt: Visit date not found    Last OARRS:        No data to display

## 2025-02-17 NOTE — ASSESSMENT & PLAN NOTE
BP today is 112/64, normotensive today  Recently spironolactone was discontinued, due to hyponatremia, recent serum Na normal  Would benefit from diuretic, but did not tolerate HCTZ and spironolactone was discontinued  Continue coreg as medical therapy

## 2025-02-17 NOTE — TELEPHONE ENCOUNTER
Medication:   Requested Prescriptions     Pending Prescriptions Disp Refills    topiramate (TOPAMAX) 25 MG tablet [Pharmacy Med Name: TOPIRAMATE 25MG TABLETS] 30 tablet 0     Sig: TAKE 1 TABLET BY MOUTH EVERY NIGHT        Last Filled: 01/17/2025      Patient Phone Number: [phone] (home)     Last appt: 2/12/2025   Next appt: Visit date not found    Last OARRS:        No data to display

## 2025-02-17 NOTE — PROGRESS NOTES
GENERAL CARDIOLOGY    REFERRING PROVIDER  Rudy Valdivia APRN - CLAY     CHIEF COMPLAINT  Palpitations        L   JUDIE Montilla is a 39 y.o. female presents to the clinic for follow up from OV 12/17/2024, initial referral for palpitations.      Recent ED visits on 11/14 for allergic reaction after taking Tylenol, 11/19 for HTN and HA (BP in /66 mmHg), admitted 11/24-11/25 for HTN, migraine, chest pain, palpitations.  Treated for hyponatremia serum Na 125, drinking large volume of water.  Stress testing was normal.  Aldactone was discontinued.    Medical history reviewed, significant for hypertension.    Today  She reports that she is having constant mid sternal chest pain for 2 weeks  Taking a deep breath helps with symptoms, moving her chest outward helps with symptoms  Denies recent pulmonary infectious symptoms, no relation to food intake, no recent unusual physical activity  She describes \"wheezing inside\", reports also abdominal bloating and has nausea, no constipation    ASSESSMENT AND PLAN  Primary hypertension  BP today is 112/64, normotensive today  Recently spironolactone was discontinued, due to hyponatremia, recent serum Na normal  Would benefit from diuretic, but did not tolerate HCTZ and spironolactone was discontinued  Continue coreg as medical therapy    Palpitations  Does not offer palpitations today in office  48 H Holter monitor July 2024 unremarkable  MCOT 10/2024, normal avg HR, 3 short PATs, no complex or sustained arrhythmia  Recent Stress Test 11/2024 negative  Echo with preserved LVEF, RV systolic function, no significant VHD  Labs including TSH, CBC unremarkable, CMP with normal renal function and potassium  Continue coreg as prescribed  Encouraged sleep apnea evaluation    ROLDAN (obstructive sleep apnea)  Suspected sleep apnea  Sleep Medicine referral sent      Other chest pain  Reports 2 weeks of central chest pain without resolution  Somewhat reproducible by

## 2025-02-18 RX ORDER — TOPIRAMATE 25 MG/1
25 TABLET, FILM COATED ORAL NIGHTLY
Qty: 30 TABLET | Refills: 0 | Status: SHIPPED | OUTPATIENT
Start: 2025-02-18

## 2025-02-20 ENCOUNTER — APPOINTMENT (OUTPATIENT)
Dept: GENERAL RADIOLOGY | Age: 40
End: 2025-02-20
Payer: COMMERCIAL

## 2025-02-20 ENCOUNTER — OFFICE VISIT (OUTPATIENT)
Dept: CARDIOLOGY CLINIC | Age: 40
End: 2025-02-20
Payer: COMMERCIAL

## 2025-02-20 ENCOUNTER — HOSPITAL ENCOUNTER (EMERGENCY)
Age: 40
Discharge: HOME | End: 2025-02-20
Payer: COMMERCIAL

## 2025-02-20 VITALS
SYSTOLIC BLOOD PRESSURE: 112 MMHG | OXYGEN SATURATION: 99 % | WEIGHT: 184.9 LBS | BODY MASS INDEX: 26.47 KG/M2 | DIASTOLIC BLOOD PRESSURE: 64 MMHG | HEIGHT: 70 IN | HEART RATE: 69 BPM

## 2025-02-20 VITALS
RESPIRATION RATE: 18 BRPM | HEART RATE: 73 BPM | OXYGEN SATURATION: 100 % | SYSTOLIC BLOOD PRESSURE: 123 MMHG | DIASTOLIC BLOOD PRESSURE: 76 MMHG

## 2025-02-20 DIAGNOSIS — R07.89 ATYPICAL CHEST PAIN: Primary | ICD-10-CM

## 2025-02-20 DIAGNOSIS — R07.89 OTHER CHEST PAIN: ICD-10-CM

## 2025-02-20 DIAGNOSIS — I10 PRIMARY HYPERTENSION: ICD-10-CM

## 2025-02-20 DIAGNOSIS — R10.13 DYSPEPSIA: ICD-10-CM

## 2025-02-20 DIAGNOSIS — R00.2 PALPITATIONS: Primary | ICD-10-CM

## 2025-02-20 DIAGNOSIS — G47.33 OSA (OBSTRUCTIVE SLEEP APNEA): ICD-10-CM

## 2025-02-20 LAB
ALBUMIN SERPL-MCNC: 4.4 G/DL (ref 3.4–5)
ALBUMIN/GLOB SERPL: 1.3 {RATIO} (ref 1.1–2.2)
ALP SERPL-CCNC: 43 U/L (ref 40–129)
ALT SERPL-CCNC: 11 U/L (ref 10–40)
ANION GAP SERPL CALCULATED.3IONS-SCNC: 9 MMOL/L (ref 3–16)
AST SERPL-CCNC: 16 U/L (ref 15–37)
BASOPHILS # BLD: 0.1 K/UL (ref 0–0.2)
BASOPHILS NFR BLD: 1.2 %
BILIRUB SERPL-MCNC: <0.2 MG/DL (ref 0–1)
BUN SERPL-MCNC: 12 MG/DL (ref 7–20)
CALCIUM SERPL-MCNC: 9.6 MG/DL (ref 8.3–10.6)
CHLORIDE SERPL-SCNC: 103 MMOL/L (ref 99–110)
CO2 SERPL-SCNC: 24 MMOL/L (ref 21–32)
CREAT SERPL-MCNC: 0.8 MG/DL (ref 0.6–1.1)
DEPRECATED RDW RBC AUTO: 14 % (ref 12.4–15.4)
EOSINOPHIL # BLD: 0.6 K/UL (ref 0–0.6)
EOSINOPHIL NFR BLD: 13.3 %
GFR SERPLBLD CREATININE-BSD FMLA CKD-EPI: >90 ML/MIN/{1.73_M2}
GLUCOSE SERPL-MCNC: 89 MG/DL (ref 70–99)
HCG SERPL QL: NEGATIVE
HCT VFR BLD AUTO: 36.8 % (ref 36–48)
HGB BLD-MCNC: 12 G/DL (ref 12–16)
LIPASE SERPL-CCNC: 26 U/L (ref 13–60)
LYMPHOCYTES # BLD: 1.6 K/UL (ref 1–5.1)
LYMPHOCYTES NFR BLD: 36.3 %
MCH RBC QN AUTO: 27.7 PG (ref 26–34)
MCHC RBC AUTO-ENTMCNC: 32.6 G/DL (ref 31–36)
MCV RBC AUTO: 85 FL (ref 80–100)
MONOCYTES # BLD: 0.3 K/UL (ref 0–1.3)
MONOCYTES NFR BLD: 6.2 %
NEUTROPHILS # BLD: 1.9 K/UL (ref 1.7–7.7)
NEUTROPHILS NFR BLD: 43 %
NT-PROBNP SERPL-MCNC: <36 PG/ML (ref 0–124)
PLATELET # BLD AUTO: 213 K/UL (ref 135–450)
PMV BLD AUTO: 8.7 FL (ref 5–10.5)
POTASSIUM SERPL-SCNC: 4.4 MMOL/L (ref 3.5–5.1)
PROT SERPL-MCNC: 7.9 G/DL (ref 6.4–8.2)
RBC # BLD AUTO: 4.33 M/UL (ref 4–5.2)
SODIUM SERPL-SCNC: 136 MMOL/L (ref 136–145)
TROPONIN, HIGH SENSITIVITY: <6 NG/L (ref 0–14)
TROPONIN, HIGH SENSITIVITY: <6 NG/L (ref 0–14)
WBC # BLD AUTO: 4.5 K/UL (ref 4–11)

## 2025-02-20 PROCEDURE — 71046 X-RAY EXAM CHEST 2 VIEWS: CPT

## 2025-02-20 PROCEDURE — 83690 ASSAY OF LIPASE: CPT

## 2025-02-20 PROCEDURE — 99285 EMERGENCY DEPT VISIT HI MDM: CPT

## 2025-02-20 PROCEDURE — 3078F DIAST BP <80 MM HG: CPT | Performed by: INTERNAL MEDICINE

## 2025-02-20 PROCEDURE — 93000 ELECTROCARDIOGRAM COMPLETE: CPT | Performed by: INTERNAL MEDICINE

## 2025-02-20 PROCEDURE — 84484 ASSAY OF TROPONIN QUANT: CPT

## 2025-02-20 PROCEDURE — 84703 CHORIONIC GONADOTROPIN ASSAY: CPT

## 2025-02-20 PROCEDURE — 93005 ELECTROCARDIOGRAM TRACING: CPT | Performed by: EMERGENCY MEDICINE

## 2025-02-20 PROCEDURE — 80053 COMPREHEN METABOLIC PANEL: CPT

## 2025-02-20 PROCEDURE — 3074F SYST BP LT 130 MM HG: CPT | Performed by: INTERNAL MEDICINE

## 2025-02-20 PROCEDURE — 83880 ASSAY OF NATRIURETIC PEPTIDE: CPT

## 2025-02-20 PROCEDURE — 99215 OFFICE O/P EST HI 40 MIN: CPT | Performed by: INTERNAL MEDICINE

## 2025-02-20 PROCEDURE — 85025 COMPLETE CBC W/AUTO DIFF WBC: CPT

## 2025-02-20 RX ORDER — SPIRONOLACTONE 25 MG/1
25 TABLET ORAL DAILY
COMMUNITY
Start: 2025-01-21

## 2025-02-20 ASSESSMENT — ENCOUNTER SYMPTOMS
SHORTNESS OF BREATH: 0
ABDOMINAL PAIN: 0
VOMITING: 0
RHINORRHEA: 0
NAUSEA: 0
DIARRHEA: 0
COUGH: 0

## 2025-02-20 ASSESSMENT — PAIN DESCRIPTION - LOCATION: LOCATION: CHEST

## 2025-02-20 ASSESSMENT — PAIN - FUNCTIONAL ASSESSMENT: PAIN_FUNCTIONAL_ASSESSMENT: 0-10

## 2025-02-20 ASSESSMENT — PAIN DESCRIPTION - DESCRIPTORS: DESCRIPTORS: ACHING

## 2025-02-20 ASSESSMENT — PAIN SCALES - GENERAL: PAINLEVEL_OUTOF10: 6

## 2025-02-20 NOTE — ASSESSMENT & PLAN NOTE
Reports 2 weeks of central chest pain without resolution  Somewhat reproducible by palpation on exam  No clear worsening with exertion, not pleuritic or positional  Reports wheeze, bloating and nausea  Denies recent pulmonary infection  Advised ED visit for expedited care, patient in agreement

## 2025-02-20 NOTE — ASSESSMENT & PLAN NOTE
Does not offer palpitations today in office  48 H Holter monitor July 2024 unremarkable  MCOT 10/2024, normal avg HR, 3 short PATs, no complex or sustained arrhythmia  Recent Stress Test 11/2024 negative  Echo with preserved LVEF, RV systolic function, no significant VHD  Labs including TSH, CBC unremarkable, CMP with normal renal function and potassium  Continue coreg as prescribed  Encouraged sleep apnea evaluation

## 2025-02-21 LAB
EKG ATRIAL RATE: 63 BPM
EKG DIAGNOSIS: NORMAL
EKG P AXIS: 60 DEGREES
EKG P-R INTERVAL: 114 MS
EKG Q-T INTERVAL: 402 MS
EKG QRS DURATION: 76 MS
EKG QTC CALCULATION (BAZETT): 411 MS
EKG R AXIS: 11 DEGREES
EKG T AXIS: 13 DEGREES
EKG VENTRICULAR RATE: 63 BPM

## 2025-02-21 NOTE — ED PROVIDER NOTES
EKG interpretation by me in absence of a face-to-face evaluation by me as follows:    The 12 lead EKG was interpreted by me independent of a cardiologist as follows:  Rate: normal with a rate of 63  Rhythm: sinus  Axis: normal  Intervals: normal CO, narrow QRS, normal QTc  ST segments: no ST elevations or depressions  T waves: no abnormal inversions  Non-specific T wave changes: not present  Prior EKG comparison: EKG dated 12/27/24 is not significantly different        Arnel White MD  02/20/25 4300    
possible for a visit in 3 days      [facility]  [address] [address]  [phone]    As needed, If symptoms worsen      DISCHARGE MEDICATIONS:  New Prescriptions    No medications on file       DISCONTINUED MEDICATIONS:  Discontinued Medications    No medications on file              (Please note that portions of this note were completed with a voice recognition program.  Efforts were made to edit the dictations but occasionally words are mis-transcribed.)    Simi Aragon PA-C (electronically signed)        Simi Aragon PA-C  02/20/25 1953

## 2025-02-28 ENCOUNTER — HOSPITAL ENCOUNTER (OUTPATIENT)
Dept: PULMONOLOGY | Age: 40
Discharge: HOME | End: 2025-02-28
Attending: FAMILY MEDICINE
Payer: COMMERCIAL

## 2025-02-28 VITALS — RESPIRATION RATE: 18 BRPM | OXYGEN SATURATION: 98 % | HEART RATE: 62 BPM

## 2025-02-28 DIAGNOSIS — R07.9 RECURRENT CHEST PAIN: ICD-10-CM

## 2025-02-28 LAB
DLCO %PRED: 88 %
DLCO PRED: NORMAL
DLCO/VA %PRED: NORMAL
DLCO/VA PRED: NORMAL
DLCO/VA: NORMAL
DLCO: NORMAL
EXPIRATORY TIME-POST: NORMAL
EXPIRATORY TIME: NORMAL
FEF 25-75 %CHNG: NORMAL
FEF 25-75 POST %PRED: NORMAL
FEF 25-75% %PRED-PRE: NORMAL
FEF 25-75% PRED: NORMAL
FEF 25-75-POST: NORMAL
FEF 25-75-PRE: NORMAL
FEV1 %PRED-POST: NORMAL
FEV1 %PRED-PRE: 72 %
FEV1 PRED: NORMAL
FEV1-POST: NORMAL
FEV1-PRE: NORMAL
FEV1/FVC %PRED-POST: NORMAL
FEV1/FVC %PRED-PRE: NORMAL
FEV1/FVC PRED: NORMAL
FEV1/FVC-POST: NORMAL
FEV1/FVC-PRE: 82 %
FVC %PRED-POST: NORMAL
FVC %PRED-PRE: NORMAL
FVC PRED: NORMAL
FVC-POST: NORMAL
FVC-PRE: NORMAL
GAW %PRED: NORMAL
GAW PRED: NORMAL
GAW: NORMAL
IC PRE %PRED: NORMAL
IC PRED: NORMAL
IC: NORMAL
MEP: NORMAL
MIP: NORMAL
MVV %PRED-PRE: NORMAL
MVV PRED: NORMAL
MVV-PRE: NORMAL
PEF %PRED-POST: NORMAL
PEF %PRED-PRE: NORMAL
PEF PRED: NORMAL
PEF%CHNG: NORMAL
PEF-POST: NORMAL
PEF-PRE: NORMAL
RAW %PRED: NORMAL
RAW PRED: NORMAL
RAW: NORMAL
RV PRE %PRED: NORMAL
RV PRED: NORMAL
RV: NORMAL
SVC %PRED: NORMAL
SVC PRED: NORMAL
SVC: NORMAL
TLC PRE %PRED: 78 %
TLC PRED: NORMAL
TLC: NORMAL
VA %PRED: NORMAL
VA PRED: NORMAL
VA: NORMAL
VTG %PRED: NORMAL
VTG PRED: NORMAL
VTG: NORMAL

## 2025-02-28 PROCEDURE — 94760 N-INVAS EAR/PLS OXIMETRY 1: CPT

## 2025-02-28 PROCEDURE — 94010 BREATHING CAPACITY TEST: CPT

## 2025-02-28 PROCEDURE — 94726 PLETHYSMOGRAPHY LUNG VOLUMES: CPT

## 2025-02-28 PROCEDURE — 94729 DIFFUSING CAPACITY: CPT

## 2025-02-28 RX ORDER — ALBUTEROL SULFATE 90 UG/1
4 INHALANT RESPIRATORY (INHALATION) ONCE
Status: CANCELLED | OUTPATIENT
Start: 2025-02-28

## 2025-02-28 ASSESSMENT — PULMONARY FUNCTION TESTS
FEV1/FVC_PRE: 82
FEV1_PERCENT_PREDICTED_PRE: 72

## 2025-03-04 ENCOUNTER — TELEPHONE (OUTPATIENT)
Dept: FAMILY MEDICINE CLINIC | Age: 40
End: 2025-03-04

## 2025-03-04 ENCOUNTER — TELEMEDICINE (OUTPATIENT)
Dept: FAMILY MEDICINE CLINIC | Age: 40
End: 2025-03-04
Payer: COMMERCIAL

## 2025-03-04 DIAGNOSIS — I10 PRIMARY HYPERTENSION: ICD-10-CM

## 2025-03-04 DIAGNOSIS — Z71.2 ENCOUNTER TO DISCUSS TEST RESULTS: Primary | ICD-10-CM

## 2025-03-04 PROCEDURE — 99213 OFFICE O/P EST LOW 20 MIN: CPT | Performed by: FAMILY MEDICINE

## 2025-03-04 RX ORDER — CARVEDILOL 6.25 MG/1
12.5 TABLET ORAL 2 TIMES DAILY
Qty: 360 TABLET | Refills: 1 | Status: SHIPPED | OUTPATIENT
Start: 2025-03-04

## 2025-03-04 NOTE — TELEPHONE ENCOUNTER
Medication:   Requested Prescriptions     Pending Prescriptions Disp Refills    carvedilol (COREG) 6.25 MG tablet 180 tablet 0     Sig: Take 2 tablets by mouth 2 times daily        Last Filled:      Patient Phone Number: [phone] (home)     Last appt: 2/12/2025   Next appt: Visit date not found    Last OARRS:        No data to display

## 2025-03-05 DIAGNOSIS — I10 PRIMARY HYPERTENSION: ICD-10-CM

## 2025-03-05 RX ORDER — CARVEDILOL 6.25 MG/1
12.5 TABLET ORAL 2 TIMES DAILY
Qty: 180 TABLET | OUTPATIENT
Start: 2025-03-05

## 2025-03-05 ASSESSMENT — ENCOUNTER SYMPTOMS
EYE PAIN: 0
EYE ITCHING: 0
SORE THROAT: 0
SINUS PRESSURE: 0
RHINORRHEA: 0
COUGH: 0
ABDOMINAL PAIN: 0
EYES NEGATIVE: 1
WHEEZING: 0
SHORTNESS OF BREATH: 0

## 2025-03-05 NOTE — PROGRESS NOTES
Melissa Montilla (:  1985) is a Established patient, here for evaluation of the following:    Objective   Patient-Reported Vitals  No data recorded     Physical Exam    Constitutional: [x] Appears well-developed and well-nourished [x] No apparent distress      [] Abnormal -     Mental status: [x] Alert and awake  [x] Oriented to person/place/time [x] Able to follow commands    [] Abnormal -            Psychiatric:       [x] Normal Affect [] Abnormal -        [x] No Hallucinations           Assessment & Plan   Below is the assessment and plan developed based on review of pertinent history, physical exam, labs, studies, and medications.  1. Encounter to discuss test results  Pt may try albuterol inhaler if needed      Subjective   HPI  39 yr old female evaluated virtually to discuss test results.  Last PFT shows  Spirometry:  Flow volume loops were normal. The FEV-1/FVC ratio was normal. The pre-bronchodilator FEV-1 was 2.09 liters (72% of predicted), which was moderately decreased. The FVC was 2.55 liters (73% of predicted), which was decreased. Response to inhaled bronchodilators (albuterol) was not performed.     Lung volumes:  Lung volumes were tested by plethysmography. The total lung capacity was 3.98 liters (78% of predicted), which was decreased. The residual volume was 1.46 liters (93% of predicted), which was normal. The ratio of residual volume to total lung capacity (RV/TLC) was 37, which was increased.      Diffusion capacity was found to be 88% predicted which is Normal.       Interpretation:  Mild restriction only. Clinical correlation is recommended.   patient states she is sob anymore    Review of Systems   Constitutional: Negative.  Negative for fatigue.   HENT:  Negative for congestion, ear pain, rhinorrhea, sinus pressure and sore throat.    Eyes: Negative.  Negative for pain and itching.   Respiratory:  Negative for cough, shortness of breath and wheezing.    Cardiovascular:  Negative

## 2025-03-25 DIAGNOSIS — G43.009 MIGRAINE WITHOUT AURA AND WITHOUT STATUS MIGRAINOSUS, NOT INTRACTABLE: ICD-10-CM

## 2025-03-25 RX ORDER — TOPIRAMATE 25 MG/1
25 TABLET, FILM COATED ORAL NIGHTLY
Qty: 30 TABLET | Refills: 2 | Status: SHIPPED | OUTPATIENT
Start: 2025-03-25

## 2025-03-25 NOTE — TELEPHONE ENCOUNTER
Medication:   Requested Prescriptions     Pending Prescriptions Disp Refills    topiramate (TOPAMAX) 25 MG tablet [Pharmacy Med Name: TOPIRAMATE 25MG TABLETS] 30 tablet 0     Sig: TAKE 1 TABLET BY MOUTH EVERY NIGHT        Last Filled:  02/18/2025    Patient Phone Number: [phone] (home)     Last appt: 3/4/2025   Next appt: Visit date not found    Last OARRS:        No data to display

## 2025-04-14 DIAGNOSIS — G47.00 INSOMNIA, UNSPECIFIED TYPE: ICD-10-CM

## 2025-04-14 RX ORDER — TRAZODONE HYDROCHLORIDE 50 MG/1
50 TABLET ORAL NIGHTLY
Qty: 30 TABLET | Refills: 1 | Status: SHIPPED | OUTPATIENT
Start: 2025-04-14

## 2025-04-14 NOTE — TELEPHONE ENCOUNTER
Last OV: 1/8/25  Next OV: 5/9/25  Last Fill: 2/5/25    traZODone (DESYREL) 50 MG tablet [[id number]]    Order Details  Dose: 50 mg Route: Oral Frequency: NIGHTLY   Dispense Quantity: 30 tablet Refills: 1          Sig: TAKE 1 TABLET BY MOUTH EVERY NIGHT         Start Date: 02/05/25 End Date: --   Written Date: 02/05/25 Expiration Date: 02/05/26       Associated Diagnoses: Insomnia, unspecified type [G47.00]   Original Order: traZODone (DESYREL) 50 MG tablet [[id number]]   Providers    Authorizing Provider: Mir Helton MD NPI: [id number]   Ordering User: Mir Helton MD          Pharmacy    [id number] [address] [phone] -  [phone]  [address] [place] [id number]  Phone: [phone]  Fax: [phone]

## 2025-04-21 RX ORDER — SPIRONOLACTONE 25 MG/1
25 TABLET ORAL DAILY
Qty: 90 TABLET | OUTPATIENT
Start: 2025-04-21

## 2025-04-21 NOTE — TELEPHONE ENCOUNTER
I spoke w/ pt. She states she only takes Aldactone as needed when b/p is high. She took 1 tab yesterday. She takes if diastolic b/p is high.

## 2025-04-21 NOTE — TELEPHONE ENCOUNTER
I called pt and relayed to her that medication was discontinued per NPTS. She may contact her pcp regarding rx. Melissa verbalized understanding. Rx denied

## 2025-04-23 ENCOUNTER — TELEPHONE (OUTPATIENT)
Dept: FAMILY MEDICINE CLINIC | Age: 40
End: 2025-04-23

## 2025-04-23 NOTE — TELEPHONE ENCOUNTER
Left vm to schedule appt.  Pt requested    I want to do the laboratory test for sodium and A1C and cholesterol

## 2025-04-25 ENCOUNTER — OFFICE VISIT (OUTPATIENT)
Dept: FAMILY MEDICINE CLINIC | Age: 40
End: 2025-04-25

## 2025-04-25 VITALS
BODY MASS INDEX: 26.34 KG/M2 | TEMPERATURE: 97.9 F | OXYGEN SATURATION: 99 % | DIASTOLIC BLOOD PRESSURE: 62 MMHG | WEIGHT: 184 LBS | HEART RATE: 61 BPM | HEIGHT: 70 IN | SYSTOLIC BLOOD PRESSURE: 96 MMHG

## 2025-04-25 DIAGNOSIS — E53.8 VITAMIN B 12 DEFICIENCY: ICD-10-CM

## 2025-04-25 DIAGNOSIS — I95.89 OTHER SPECIFIED HYPOTENSION: ICD-10-CM

## 2025-04-25 DIAGNOSIS — D50.8 OTHER IRON DEFICIENCY ANEMIA: Primary | ICD-10-CM

## 2025-04-25 PROCEDURE — 3078F DIAST BP <80 MM HG: CPT | Performed by: FAMILY MEDICINE

## 2025-04-25 PROCEDURE — 3074F SYST BP LT 130 MM HG: CPT | Performed by: FAMILY MEDICINE

## 2025-04-25 PROCEDURE — 99214 OFFICE O/P EST MOD 30 MIN: CPT | Performed by: FAMILY MEDICINE

## 2025-04-25 RX ORDER — LANOLIN ALCOHOL/MO/W.PET/CERES
1000 CREAM (GRAM) TOPICAL DAILY
Qty: 90 TABLET | Refills: 3 | Status: SHIPPED | OUTPATIENT
Start: 2025-04-25

## 2025-04-28 ASSESSMENT — ENCOUNTER SYMPTOMS
SINUS PRESSURE: 0
ABDOMINAL PAIN: 0
EYES NEGATIVE: 1
WHEEZING: 0
COUGH: 0
SORE THROAT: 0
EYE PAIN: 0
RHINORRHEA: 0
EYE ITCHING: 0
SHORTNESS OF BREATH: 0

## 2025-04-29 DIAGNOSIS — D50.8 OTHER IRON DEFICIENCY ANEMIA: ICD-10-CM

## 2025-04-29 DIAGNOSIS — E53.8 VITAMIN B 12 DEFICIENCY: ICD-10-CM

## 2025-04-29 DIAGNOSIS — E88.810 METABOLIC SYNDROME: ICD-10-CM

## 2025-04-29 LAB
25(OH)D3 SERPL-MCNC: 27 NG/ML
BASOPHILS # BLD: 0 K/UL (ref 0–0.2)
BASOPHILS NFR BLD: 1 %
DEPRECATED RDW RBC AUTO: 14.3 % (ref 12.4–15.4)
EOSINOPHIL # BLD: 0.2 K/UL (ref 0–0.6)
EOSINOPHIL NFR BLD: 4.5 %
FOLATE SERPL-MCNC: 12.2 NG/ML (ref 4.78–24.2)
HCT VFR BLD AUTO: 33.5 % (ref 36–48)
HGB BLD-MCNC: 11.4 G/DL (ref 12–16)
LYMPHOCYTES # BLD: 1.4 K/UL (ref 1–5.1)
LYMPHOCYTES NFR BLD: 34.9 %
MCH RBC QN AUTO: 27.5 PG (ref 26–34)
MCHC RBC AUTO-ENTMCNC: 33.9 G/DL (ref 31–36)
MCV RBC AUTO: 81.2 FL (ref 80–100)
MONOCYTES # BLD: 0.2 K/UL (ref 0–1.3)
MONOCYTES NFR BLD: 6 %
NEUTROPHILS # BLD: 2.1 K/UL (ref 1.7–7.7)
NEUTROPHILS NFR BLD: 53.6 %
PLATELET # BLD AUTO: 223 K/UL (ref 135–450)
PMV BLD AUTO: 9.7 FL (ref 5–10.5)
RBC # BLD AUTO: 4.13 M/UL (ref 4–5.2)
VIT B12 SERPL-MCNC: 449 PG/ML (ref 211–911)
WBC # BLD AUTO: 4 K/UL (ref 4–11)

## 2025-04-30 ENCOUNTER — TELEMEDICINE (OUTPATIENT)
Dept: FAMILY MEDICINE CLINIC | Age: 40
End: 2025-04-30
Payer: COMMERCIAL

## 2025-04-30 DIAGNOSIS — I10 PRIMARY HYPERTENSION: Primary | ICD-10-CM

## 2025-04-30 PROCEDURE — 99213 OFFICE O/P EST LOW 20 MIN: CPT | Performed by: FAMILY MEDICINE

## 2025-04-30 ASSESSMENT — ENCOUNTER SYMPTOMS
COUGH: 0
SORE THROAT: 0
EYE ITCHING: 0
EYES NEGATIVE: 1
SHORTNESS OF BREATH: 0
EYE PAIN: 0
WHEEZING: 0
ABDOMINAL PAIN: 0
SINUS PRESSURE: 0
RHINORRHEA: 0

## 2025-04-30 NOTE — PROGRESS NOTES
Melissa Montilla (:  1985) is a Established patient, here for evaluation of the following:    Objective   Patient-Reported Vitals  No data recorded     Physical Exam    Constitutional: [x] Appears well-developed and well-nourished [x] No apparent distress      [] Abnormal -     Mental status: [x] Alert and awake  [x] Oriented to person/place/time [x] Able to follow commands    [] Abnormal -            Psychiatric:       [x] Normal Affect [] Abnormal -        [x] No Hallucinations           Assessment & Plan   Below is the assessment and plan developed based on review of pertinent history, physical exam, labs, studies, and medications.  1. Primary hypertension  Continue coreg bid and monitor bp         Subjective   HPI  39 yr old female evaluated virtually to discuss bp  Pt states the she reduced her coreg dose as discussed and her bp shot upto 180/100.  She was advised to try to reduced the dose as she was having low bp  Pt stated she started taking coreg twice a day   Review of Systems   Constitutional: Negative.  Negative for fatigue.   HENT:  Negative for congestion, ear pain, rhinorrhea, sinus pressure and sore throat.    Eyes: Negative.  Negative for pain and itching.   Respiratory:  Negative for cough, shortness of breath and wheezing.    Cardiovascular:  Negative for chest pain and palpitations.   Gastrointestinal:  Negative for abdominal pain.   Genitourinary:  Negative for frequency and urgency.   Musculoskeletal:  Negative for gait problem.   Skin:  Negative for rash.   Neurological:  Negative for dizziness and headaches.   Psychiatric/Behavioral:  The patient is not nervous/anxious.                   Melissa Montilla, was evaluated through a synchronous (real-time) audio-video encounter. The patient (or guardian if applicable) is aware that this is a billable service, which includes applicable co-pays. This Virtual Visit was conducted with patient's (and/or legal guardian's) consent.

## 2025-05-01 ENCOUNTER — RESULTS FOLLOW-UP (OUTPATIENT)
Dept: FAMILY MEDICINE CLINIC | Age: 40
End: 2025-05-01

## 2025-05-01 LAB
INSULIN COMMENT: NORMAL
INSULIN REFERENCE RANGE:: NORMAL
INSULIN SERPL-ACNC: 51.6 MU/L

## 2025-05-07 RX ORDER — SPIRONOLACTONE 25 MG/1
25 TABLET ORAL DAILY
Qty: 30 TABLET | Refills: 3 | Status: SHIPPED | OUTPATIENT
Start: 2025-05-07

## 2025-05-07 NOTE — TELEPHONE ENCOUNTER
Requested Prescriptions     Pending Prescriptions Disp Refills    spironolactone (ALDACTONE) 25 MG tablet 30 tablet 3     Sig: Take 1 tablet by mouth daily      Last OV:  2/20/2025 SACHIN    Next OV: Visit/recall date not found     Last Labs: 02/20/2025 CMP    Last Filled: 01/21/2025

## 2025-05-27 ENCOUNTER — TELEPHONE (OUTPATIENT)
Dept: NEUROLOGY | Age: 40
End: 2025-05-27

## 2025-06-09 ENCOUNTER — TELEMEDICINE (OUTPATIENT)
Dept: FAMILY MEDICINE CLINIC | Age: 40
End: 2025-06-09
Payer: COMMERCIAL

## 2025-06-09 DIAGNOSIS — I10 PRIMARY HYPERTENSION: Primary | ICD-10-CM

## 2025-06-09 DIAGNOSIS — E53.8 VITAMIN B 12 DEFICIENCY: ICD-10-CM

## 2025-06-09 DIAGNOSIS — I10 PRIMARY HYPERTENSION: ICD-10-CM

## 2025-06-09 PROCEDURE — 99213 OFFICE O/P EST LOW 20 MIN: CPT | Performed by: FAMILY MEDICINE

## 2025-06-09 ASSESSMENT — ENCOUNTER SYMPTOMS
SORE THROAT: 0
EYE ITCHING: 0
EYE PAIN: 0
ABDOMINAL PAIN: 0
WHEEZING: 0
COUGH: 0
SINUS PRESSURE: 0
EYES NEGATIVE: 1
SHORTNESS OF BREATH: 0
RHINORRHEA: 0

## 2025-06-09 NOTE — PROGRESS NOTES
Melissa Montilla (:  1985) is a Established patient, here for evaluation of the following:    Objective   Patient-Reported Vitals  No data recorded     Physical Exam    Constitutional: [x] Appears well-developed and well-nourished [x] No apparent distress      [] Abnormal -     Mental status: [x] Alert and awake  [x] Oriented to person/place/time [x] Able to follow commands    [] Abnormal -            Psychiatric:       [x] Normal Affect [] Abnormal -        [x] No Hallucinations           Assessment & Plan   Below is the assessment and plan developed based on review of pertinent history, physical exam, labs, studies, and medications.  1. Primary hypertension  -     Lipid Panel; Future  -     Comprehensive Metabolic Panel; Future  2. Vitamin B 12 deficiency  Check vit b12 levels         Subjective   HPI  40 yr old femle evaluated virtually  Htn - pt continue to take coreg  She is needing to get her sodium checked  She is requesting fastin glucose     Vit b12 def in past  Review of Systems   Constitutional: Negative.  Negative for fatigue.   HENT:  Negative for congestion, ear pain, rhinorrhea, sinus pressure and sore throat.    Eyes: Negative.  Negative for pain and itching.   Respiratory:  Negative for cough, shortness of breath and wheezing.    Cardiovascular:  Negative for chest pain and palpitations.   Gastrointestinal:  Negative for abdominal pain.   Genitourinary:  Negative for frequency and urgency.   Musculoskeletal:  Negative for gait problem.   Skin:  Negative for rash.   Neurological:  Negative for dizziness and headaches.   Psychiatric/Behavioral:  The patient is not nervous/anxious.                   Melissa Montilla, was evaluated through a synchronous (real-time) audio-video encounter. The patient (or guardian if applicable) is aware that this is a billable service, which includes applicable co-pays. This Virtual Visit was conducted with patient's (and/or legal guardian's) consent.

## 2025-06-10 ENCOUNTER — RESULTS FOLLOW-UP (OUTPATIENT)
Dept: FAMILY MEDICINE CLINIC | Age: 40
End: 2025-06-10

## 2025-06-10 LAB
ALBUMIN SERPL-MCNC: 4.2 G/DL (ref 3.4–5)
ALBUMIN/GLOB SERPL: 1.6 {RATIO} (ref 1.1–2.2)
ALP SERPL-CCNC: 38 U/L (ref 40–129)
ALT SERPL-CCNC: 13 U/L (ref 10–40)
ANION GAP SERPL CALCULATED.3IONS-SCNC: 9 MMOL/L (ref 3–16)
AST SERPL-CCNC: 17 U/L (ref 15–37)
BILIRUB SERPL-MCNC: 0.3 MG/DL (ref 0–1)
BUN SERPL-MCNC: 10 MG/DL (ref 7–20)
CALCIUM SERPL-MCNC: 9 MG/DL (ref 8.3–10.6)
CHLORIDE SERPL-SCNC: 104 MMOL/L (ref 99–110)
CHOLEST SERPL-MCNC: 171 MG/DL (ref 0–199)
CO2 SERPL-SCNC: 24 MMOL/L (ref 21–32)
CREAT SERPL-MCNC: 0.8 MG/DL (ref 0.6–1.1)
GFR SERPLBLD CREATININE-BSD FMLA CKD-EPI: >90 ML/MIN/{1.73_M2}
GLUCOSE SERPL-MCNC: 97 MG/DL (ref 70–99)
HDLC SERPL-MCNC: 39 MG/DL (ref 40–60)
LDLC SERPL CALC-MCNC: 110 MG/DL
POTASSIUM SERPL-SCNC: 4.3 MMOL/L (ref 3.5–5.1)
PROT SERPL-MCNC: 6.8 G/DL (ref 6.4–8.2)
SODIUM SERPL-SCNC: 137 MMOL/L (ref 136–145)
TRIGL SERPL-MCNC: 109 MG/DL (ref 0–150)
VIT B12 SERPL-MCNC: 624 PG/ML (ref 211–911)
VLDLC SERPL CALC-MCNC: 22 MG/DL

## 2025-06-11 ENCOUNTER — TELEMEDICINE (OUTPATIENT)
Dept: FAMILY MEDICINE CLINIC | Age: 40
End: 2025-06-11
Payer: COMMERCIAL

## 2025-06-11 DIAGNOSIS — Z71.2 ENCOUNTER TO DISCUSS TEST RESULTS: Primary | ICD-10-CM

## 2025-06-11 PROCEDURE — 99213 OFFICE O/P EST LOW 20 MIN: CPT | Performed by: FAMILY MEDICINE

## 2025-06-11 ASSESSMENT — ENCOUNTER SYMPTOMS
EYE ITCHING: 0
SINUS PRESSURE: 0
WHEEZING: 0
SHORTNESS OF BREATH: 0
RHINORRHEA: 0
ABDOMINAL PAIN: 0
EYES NEGATIVE: 1
SORE THROAT: 0
EYE PAIN: 0
COUGH: 0

## 2025-06-11 NOTE — PROGRESS NOTES
Melissa Montilla (:  1985) is a Established patient, here for evaluation of the following:    Objective   Patient-Reported Vitals  No data recorded     Physical Exam    Constitutional: [x] Appears well-developed and well-nourished [x] No apparent distress      [] Abnormal -     Mental status: [x] Alert and awake  [x] Oriented to person/place/time [x] Able to follow commands    [] Abnormal -            Psychiatric:       [x] Normal Affect [] Abnormal -        [x] No Hallucinations             Assessment & Plan   Below is the assessment and plan developed based on review of pertinent history, physical exam, labs, studies, and medications.  1. Encounter to discuss test results  Lab results discussed   Lifestyle changes encouraged including diet and exercie.         Subjective   HPI  40 yr ol female evaluated virtually to discuss test resuls  Lab Results   Component Value Date    CHOL 171 2025    TRIG 109 2025    HDL 39 (L) 2025     (H) 2025    VLDL 22 2025       Review of Systems   Constitutional: Negative.  Negative for fatigue.   HENT:  Negative for congestion, ear pain, rhinorrhea, sinus pressure and sore throat.    Eyes: Negative.  Negative for pain and itching.   Respiratory:  Negative for cough, shortness of breath and wheezing.    Cardiovascular:  Negative for chest pain and palpitations.   Gastrointestinal:  Negative for abdominal pain.   Genitourinary:  Negative for frequency and urgency.   Musculoskeletal:  Negative for gait problem.   Skin:  Negative for rash.   Neurological:  Negative for dizziness and headaches.   Psychiatric/Behavioral:  The patient is not nervous/anxious.                   Melissa Montilla, was evaluated through a synchronous (real-time) audio-video encounter. The patient (or guardian if applicable) is aware that this is a billable service, which includes applicable co-pays. This Virtual Visit was conducted with patient's (and/or 
Family/Parent(s)

## 2025-06-29 DIAGNOSIS — G43.009 MIGRAINE WITHOUT AURA AND WITHOUT STATUS MIGRAINOSUS, NOT INTRACTABLE: ICD-10-CM

## 2025-06-30 RX ORDER — TOPIRAMATE 25 MG/1
25 TABLET, FILM COATED ORAL NIGHTLY
Qty: 90 TABLET | Refills: 0 | Status: SHIPPED | OUTPATIENT
Start: 2025-06-30

## 2025-06-30 NOTE — TELEPHONE ENCOUNTER
Medication:   Requested Prescriptions     Pending Prescriptions Disp Refills    topiramate (TOPAMAX) 25 MG tablet [Pharmacy Med Name: TOPIRAMATE 25MG TABLETS] 90 tablet      Sig: TAKE 1 TABLET BY MOUTH EVERY NIGHT        Last Filled:      Patient Phone Number: [phone] (home)     Last appt: 6/11/2025   Next appt: Visit date not found    Last OARRS:        No data to display

## 2025-07-10 DIAGNOSIS — G43.009 MIGRAINE WITHOUT AURA AND WITHOUT STATUS MIGRAINOSUS, NOT INTRACTABLE: ICD-10-CM

## 2025-07-10 RX ORDER — PROMETHAZINE HYDROCHLORIDE 6.25 MG/5ML
SYRUP ORAL
Qty: 240 ML | Refills: 2 | Status: SHIPPED | OUTPATIENT
Start: 2025-07-10

## 2025-08-26 ENCOUNTER — TELEMEDICINE (OUTPATIENT)
Dept: FAMILY MEDICINE CLINIC | Age: 40
End: 2025-08-26
Payer: COMMERCIAL

## 2025-08-26 DIAGNOSIS — I10 PRIMARY HYPERTENSION: Primary | ICD-10-CM

## 2025-08-26 PROCEDURE — 99214 OFFICE O/P EST MOD 30 MIN: CPT | Performed by: FAMILY MEDICINE

## 2025-08-26 RX ORDER — CARVEDILOL 12.5 MG/1
12.5 TABLET ORAL 2 TIMES DAILY
Qty: 60 TABLET | Refills: 0 | Status: SHIPPED | OUTPATIENT
Start: 2025-08-26

## 2025-08-26 ASSESSMENT — ENCOUNTER SYMPTOMS
SINUS PRESSURE: 0
SORE THROAT: 0
ABDOMINAL PAIN: 0
EYE PAIN: 0
WHEEZING: 0
RHINORRHEA: 0
EYES NEGATIVE: 1
SHORTNESS OF BREATH: 0
EYE ITCHING: 0
COUGH: 0